# Patient Record
Sex: MALE | Race: WHITE | NOT HISPANIC OR LATINO | Employment: OTHER | URBAN - METROPOLITAN AREA
[De-identification: names, ages, dates, MRNs, and addresses within clinical notes are randomized per-mention and may not be internally consistent; named-entity substitution may affect disease eponyms.]

---

## 2017-06-05 ENCOUNTER — COMMUNICATION - HEALTHEAST (OUTPATIENT)
Dept: SCHEDULING | Facility: CLINIC | Age: 57
End: 2017-06-05

## 2017-09-03 ENCOUNTER — COMMUNICATION - HEALTHEAST (OUTPATIENT)
Dept: SCHEDULING | Facility: CLINIC | Age: 57
End: 2017-09-03

## 2017-09-03 DIAGNOSIS — E78.5 HYPERLIPIDEMIA: ICD-10-CM

## 2018-01-08 ENCOUNTER — OFFICE VISIT - HEALTHEAST (OUTPATIENT)
Dept: INTERNAL MEDICINE | Facility: CLINIC | Age: 58
End: 2018-01-08

## 2018-01-08 DIAGNOSIS — Z79.899 MEDICATION MANAGEMENT: ICD-10-CM

## 2018-01-08 DIAGNOSIS — Z00.00 ROUTINE GENERAL MEDICAL EXAMINATION AT A HEALTH CARE FACILITY: ICD-10-CM

## 2018-01-08 DIAGNOSIS — I10 ESSENTIAL HYPERTENSION: ICD-10-CM

## 2018-01-08 DIAGNOSIS — Z23 NEED FOR VACCINATION: ICD-10-CM

## 2018-01-08 DIAGNOSIS — Z12.5 PROSTATE CANCER SCREENING: ICD-10-CM

## 2018-01-08 DIAGNOSIS — E78.2 MIXED HYPERLIPIDEMIA: ICD-10-CM

## 2018-01-08 DIAGNOSIS — Z86.0101 HX OF ADENOMATOUS COLONIC POLYPS: ICD-10-CM

## 2018-01-08 LAB
ALBUMIN SERPL-MCNC: 3.8 G/DL (ref 3.5–5)
ALBUMIN UR-MCNC: NEGATIVE MG/DL
ALP SERPL-CCNC: 60 U/L (ref 45–120)
ALT SERPL W P-5'-P-CCNC: 32 U/L (ref 0–45)
ANION GAP SERPL CALCULATED.3IONS-SCNC: 11 MMOL/L (ref 5–18)
APPEARANCE UR: CLEAR
AST SERPL W P-5'-P-CCNC: 21 U/L (ref 0–40)
ATRIAL RATE - MUSE: 60 BPM
BACTERIA #/AREA URNS HPF: NORMAL HPF
BASOPHILS # BLD AUTO: 0.1 THOU/UL (ref 0–0.2)
BASOPHILS NFR BLD AUTO: 1 % (ref 0–2)
BILIRUB SERPL-MCNC: 0.3 MG/DL (ref 0–1)
BILIRUB UR QL STRIP: NEGATIVE
BUN SERPL-MCNC: 17 MG/DL (ref 8–22)
CALCIUM SERPL-MCNC: 9.3 MG/DL (ref 8.5–10.5)
CHLORIDE BLD-SCNC: 104 MMOL/L (ref 98–107)
CHOLEST SERPL-MCNC: 167 MG/DL
CO2 SERPL-SCNC: 25 MMOL/L (ref 22–31)
COLOR UR AUTO: YELLOW
CREAT SERPL-MCNC: 0.91 MG/DL (ref 0.7–1.3)
DIASTOLIC BLOOD PRESSURE - MUSE: NORMAL MMHG
EOSINOPHIL # BLD AUTO: 0.3 THOU/UL (ref 0–0.4)
EOSINOPHIL NFR BLD AUTO: 5 % (ref 0–6)
ERYTHROCYTE [DISTWIDTH] IN BLOOD BY AUTOMATED COUNT: 12.2 % (ref 11–14.5)
FASTING STATUS PATIENT QL REPORTED: YES
GFR SERPL CREATININE-BSD FRML MDRD: >60 ML/MIN/1.73M2
GLUCOSE BLD-MCNC: 103 MG/DL (ref 70–125)
GLUCOSE UR STRIP-MCNC: NEGATIVE MG/DL
HCT VFR BLD AUTO: 41.9 % (ref 40–54)
HDLC SERPL-MCNC: 46 MG/DL
HGB BLD-MCNC: 14.2 G/DL (ref 14–18)
HGB UR QL STRIP: ABNORMAL
INTERPRETATION ECG - MUSE: NORMAL
KETONES UR STRIP-MCNC: NEGATIVE MG/DL
LDLC SERPL CALC-MCNC: 91 MG/DL
LEUKOCYTE ESTERASE UR QL STRIP: NEGATIVE
LYMPHOCYTES # BLD AUTO: 1.8 THOU/UL (ref 0.8–4.4)
LYMPHOCYTES NFR BLD AUTO: 32 % (ref 20–40)
MCH RBC QN AUTO: 29.2 PG (ref 27–34)
MCHC RBC AUTO-ENTMCNC: 33.9 G/DL (ref 32–36)
MCV RBC AUTO: 86 FL (ref 80–100)
MONOCYTES # BLD AUTO: 0.5 THOU/UL (ref 0–0.9)
MONOCYTES NFR BLD AUTO: 9 % (ref 2–10)
NEUTROPHILS # BLD AUTO: 3.1 THOU/UL (ref 2–7.7)
NEUTROPHILS NFR BLD AUTO: 54 % (ref 50–70)
NITRATE UR QL: NEGATIVE
P AXIS - MUSE: 51 DEGREES
PH UR STRIP: 5.5 [PH] (ref 5–8)
PLATELET # BLD AUTO: 218 THOU/UL (ref 140–440)
PMV BLD AUTO: 7.2 FL (ref 7–10)
POTASSIUM BLD-SCNC: 3.5 MMOL/L (ref 3.5–5)
PR INTERVAL - MUSE: 212 MS
PROT SERPL-MCNC: 7.5 G/DL (ref 6–8)
PSA SERPL-MCNC: 0.4 NG/ML (ref 0–3.5)
QRS DURATION - MUSE: 104 MS
QT - MUSE: 404 MS
QTC - MUSE: 404 MS
R AXIS - MUSE: 21 DEGREES
RBC # BLD AUTO: 4.86 MILL/UL (ref 4.4–6.2)
RBC #/AREA URNS AUTO: NORMAL HPF
SODIUM SERPL-SCNC: 140 MMOL/L (ref 136–145)
SP GR UR STRIP: 1.02 (ref 1–1.03)
SQUAMOUS #/AREA URNS AUTO: NORMAL LPF
SYSTOLIC BLOOD PRESSURE - MUSE: NORMAL MMHG
T AXIS - MUSE: 42 DEGREES
TRIGL SERPL-MCNC: 152 MG/DL
UROBILINOGEN UR STRIP-ACNC: ABNORMAL
VENTRICULAR RATE- MUSE: 60 BPM
WBC #/AREA URNS AUTO: NORMAL HPF
WBC: 5.7 THOU/UL (ref 4–11)

## 2018-01-08 ASSESSMENT — MIFFLIN-ST. JEOR: SCORE: 1895.81

## 2018-01-09 ENCOUNTER — COMMUNICATION - HEALTHEAST (OUTPATIENT)
Dept: INTERNAL MEDICINE | Facility: CLINIC | Age: 58
End: 2018-01-09

## 2018-01-16 ENCOUNTER — COMMUNICATION - HEALTHEAST (OUTPATIENT)
Dept: INTERNAL MEDICINE | Facility: CLINIC | Age: 58
End: 2018-01-16

## 2018-01-16 DIAGNOSIS — J45.909 ASTHMA: ICD-10-CM

## 2018-01-16 DIAGNOSIS — I10 ESSENTIAL HYPERTENSION: ICD-10-CM

## 2018-01-17 ENCOUNTER — COMMUNICATION - HEALTHEAST (OUTPATIENT)
Dept: INTERNAL MEDICINE | Facility: CLINIC | Age: 58
End: 2018-01-17

## 2018-01-17 DIAGNOSIS — J45.909 ASTHMA: ICD-10-CM

## 2018-01-17 DIAGNOSIS — I10 ESSENTIAL HYPERTENSION: ICD-10-CM

## 2018-05-17 ENCOUNTER — COMMUNICATION - HEALTHEAST (OUTPATIENT)
Dept: SCHEDULING | Facility: CLINIC | Age: 58
End: 2018-05-17

## 2018-05-17 DIAGNOSIS — E78.2 MIXED HYPERLIPIDEMIA: ICD-10-CM

## 2018-10-14 ENCOUNTER — COMMUNICATION - HEALTHEAST (OUTPATIENT)
Dept: INTERNAL MEDICINE | Facility: CLINIC | Age: 58
End: 2018-10-14

## 2018-10-14 DIAGNOSIS — I10 ESSENTIAL HYPERTENSION: ICD-10-CM

## 2018-10-14 DIAGNOSIS — J45.909 ASTHMA: ICD-10-CM

## 2018-11-26 ENCOUNTER — COMMUNICATION - HEALTHEAST (OUTPATIENT)
Dept: SCHEDULING | Facility: CLINIC | Age: 58
End: 2018-11-26

## 2018-11-26 DIAGNOSIS — E78.2 MIXED HYPERLIPIDEMIA: ICD-10-CM

## 2018-12-31 ENCOUNTER — OFFICE VISIT - HEALTHEAST (OUTPATIENT)
Dept: INTERNAL MEDICINE | Facility: CLINIC | Age: 58
End: 2018-12-31

## 2018-12-31 DIAGNOSIS — I10 ESSENTIAL HYPERTENSION: ICD-10-CM

## 2018-12-31 LAB
ANION GAP SERPL CALCULATED.3IONS-SCNC: 9 MMOL/L (ref 5–18)
BUN SERPL-MCNC: 17 MG/DL (ref 8–22)
CALCIUM SERPL-MCNC: 9.9 MG/DL (ref 8.5–10.5)
CHLORIDE BLD-SCNC: 104 MMOL/L (ref 98–107)
CO2 SERPL-SCNC: 28 MMOL/L (ref 22–31)
CREAT SERPL-MCNC: 1.01 MG/DL (ref 0.7–1.3)
GFR SERPL CREATININE-BSD FRML MDRD: >60 ML/MIN/1.73M2
GLUCOSE BLD-MCNC: 86 MG/DL (ref 70–125)
POTASSIUM BLD-SCNC: 4.1 MMOL/L (ref 3.5–5)
SODIUM SERPL-SCNC: 141 MMOL/L (ref 136–145)

## 2018-12-31 ASSESSMENT — MIFFLIN-ST. JEOR: SCORE: 1845.91

## 2019-01-02 ENCOUNTER — COMMUNICATION - HEALTHEAST (OUTPATIENT)
Dept: INTERNAL MEDICINE | Facility: CLINIC | Age: 59
End: 2019-01-02

## 2019-01-03 ENCOUNTER — COMMUNICATION - HEALTHEAST (OUTPATIENT)
Dept: INTERNAL MEDICINE | Facility: CLINIC | Age: 59
End: 2019-01-03

## 2019-01-03 DIAGNOSIS — J45.909 ASTHMA: ICD-10-CM

## 2019-01-03 DIAGNOSIS — I10 ESSENTIAL HYPERTENSION: ICD-10-CM

## 2019-03-01 ENCOUNTER — COMMUNICATION - HEALTHEAST (OUTPATIENT)
Dept: SCHEDULING | Facility: CLINIC | Age: 59
End: 2019-03-01

## 2019-03-01 DIAGNOSIS — E78.2 MIXED HYPERLIPIDEMIA: ICD-10-CM

## 2019-11-08 ENCOUNTER — RECORDS - HEALTHEAST (OUTPATIENT)
Dept: ADMINISTRATIVE | Facility: OTHER | Age: 59
End: 2019-11-08

## 2019-11-21 ENCOUNTER — RECORDS - HEALTHEAST (OUTPATIENT)
Dept: ADMINISTRATIVE | Facility: OTHER | Age: 59
End: 2019-11-21

## 2019-11-28 ENCOUNTER — COMMUNICATION - HEALTHEAST (OUTPATIENT)
Dept: SCHEDULING | Facility: CLINIC | Age: 59
End: 2019-11-28

## 2019-11-28 DIAGNOSIS — I25.10 CORONARY ARTERY DISEASE INVOLVING NATIVE CORONARY ARTERY OF NATIVE HEART WITHOUT ANGINA PECTORIS: ICD-10-CM

## 2020-01-08 ENCOUNTER — OFFICE VISIT - HEALTHEAST (OUTPATIENT)
Dept: INTERNAL MEDICINE | Facility: CLINIC | Age: 60
End: 2020-01-08

## 2020-01-08 DIAGNOSIS — I10 ESSENTIAL HYPERTENSION: ICD-10-CM

## 2020-01-08 DIAGNOSIS — E78.2 MIXED HYPERLIPIDEMIA: ICD-10-CM

## 2020-01-08 DIAGNOSIS — J45.909 ASTHMA: ICD-10-CM

## 2020-01-08 LAB
ALBUMIN SERPL-MCNC: 4 G/DL (ref 3.5–5)
ALP SERPL-CCNC: 59 U/L (ref 45–120)
ALT SERPL W P-5'-P-CCNC: 23 U/L (ref 0–45)
ANION GAP SERPL CALCULATED.3IONS-SCNC: 9 MMOL/L (ref 5–18)
AST SERPL W P-5'-P-CCNC: 19 U/L (ref 0–40)
BILIRUB SERPL-MCNC: 0.4 MG/DL (ref 0–1)
BUN SERPL-MCNC: 18 MG/DL (ref 8–22)
CALCIUM SERPL-MCNC: 9.2 MG/DL (ref 8.5–10.5)
CHLORIDE BLD-SCNC: 106 MMOL/L (ref 98–107)
CHOLEST SERPL-MCNC: 161 MG/DL
CO2 SERPL-SCNC: 26 MMOL/L (ref 22–31)
CREAT SERPL-MCNC: 1.09 MG/DL (ref 0.7–1.3)
FASTING STATUS PATIENT QL REPORTED: NO
GFR SERPL CREATININE-BSD FRML MDRD: >60 ML/MIN/1.73M2
GLUCOSE BLD-MCNC: 109 MG/DL (ref 70–125)
HDLC SERPL-MCNC: 49 MG/DL
LDLC SERPL CALC-MCNC: 90 MG/DL
POTASSIUM BLD-SCNC: 3.8 MMOL/L (ref 3.5–5)
PROT SERPL-MCNC: 7.1 G/DL (ref 6–8)
SODIUM SERPL-SCNC: 141 MMOL/L (ref 136–145)
TRIGL SERPL-MCNC: 110 MG/DL

## 2020-01-08 ASSESSMENT — MIFFLIN-ST. JEOR: SCORE: 1845.91

## 2020-01-09 ENCOUNTER — COMMUNICATION - HEALTHEAST (OUTPATIENT)
Dept: INTERNAL MEDICINE | Facility: CLINIC | Age: 60
End: 2020-01-09

## 2020-01-09 DIAGNOSIS — J45.909 ASTHMA: ICD-10-CM

## 2020-01-09 DIAGNOSIS — I10 ESSENTIAL HYPERTENSION: ICD-10-CM

## 2020-01-17 ENCOUNTER — RECORDS - HEALTHEAST (OUTPATIENT)
Dept: ADMINISTRATIVE | Facility: OTHER | Age: 60
End: 2020-01-17

## 2020-03-06 ENCOUNTER — COMMUNICATION - HEALTHEAST (OUTPATIENT)
Dept: SCHEDULING | Facility: CLINIC | Age: 60
End: 2020-03-06

## 2020-03-06 DIAGNOSIS — I25.10 CORONARY ARTERY DISEASE INVOLVING NATIVE CORONARY ARTERY OF NATIVE HEART WITHOUT ANGINA PECTORIS: ICD-10-CM

## 2020-06-02 ENCOUNTER — COMMUNICATION - HEALTHEAST (OUTPATIENT)
Dept: SCHEDULING | Facility: CLINIC | Age: 60
End: 2020-06-02

## 2020-06-02 DIAGNOSIS — I25.10 CORONARY ARTERY DISEASE INVOLVING NATIVE CORONARY ARTERY OF NATIVE HEART WITHOUT ANGINA PECTORIS: ICD-10-CM

## 2020-06-11 ENCOUNTER — RECORDS - HEALTHEAST (OUTPATIENT)
Dept: ADMINISTRATIVE | Facility: OTHER | Age: 60
End: 2020-06-11

## 2020-12-11 ENCOUNTER — RECORDS - HEALTHEAST (OUTPATIENT)
Dept: ADMINISTRATIVE | Facility: OTHER | Age: 60
End: 2020-12-11

## 2020-12-14 ENCOUNTER — OFFICE VISIT - HEALTHEAST (OUTPATIENT)
Dept: INTERNAL MEDICINE | Facility: CLINIC | Age: 60
End: 2020-12-14

## 2020-12-14 ENCOUNTER — COMMUNICATION - HEALTHEAST (OUTPATIENT)
Dept: INTERNAL MEDICINE | Facility: CLINIC | Age: 60
End: 2020-12-14

## 2020-12-14 DIAGNOSIS — I10 ESSENTIAL HYPERTENSION: ICD-10-CM

## 2020-12-14 DIAGNOSIS — E78.2 MIXED HYPERLIPIDEMIA: ICD-10-CM

## 2020-12-14 DIAGNOSIS — Z12.5 SCREENING FOR PROSTATE CANCER: ICD-10-CM

## 2020-12-14 DIAGNOSIS — Z23 NEED FOR VACCINATION: ICD-10-CM

## 2020-12-14 DIAGNOSIS — J45.909 ASTHMA: ICD-10-CM

## 2020-12-14 LAB
ALBUMIN SERPL-MCNC: 4.5 G/DL (ref 3.5–5)
ALP SERPL-CCNC: 64 U/L (ref 45–120)
ALT SERPL W P-5'-P-CCNC: 34 U/L (ref 0–45)
ANION GAP SERPL CALCULATED.3IONS-SCNC: 10 MMOL/L (ref 5–18)
AST SERPL W P-5'-P-CCNC: 22 U/L (ref 0–40)
BILIRUB SERPL-MCNC: 0.6 MG/DL (ref 0–1)
BUN SERPL-MCNC: 14 MG/DL (ref 8–22)
CALCIUM SERPL-MCNC: 9.3 MG/DL (ref 8.5–10.5)
CHLORIDE BLD-SCNC: 101 MMOL/L (ref 98–107)
CHOLEST SERPL-MCNC: 196 MG/DL
CO2 SERPL-SCNC: 28 MMOL/L (ref 22–31)
CREAT SERPL-MCNC: 0.95 MG/DL (ref 0.7–1.3)
FASTING STATUS PATIENT QL REPORTED: ABNORMAL
GFR SERPL CREATININE-BSD FRML MDRD: >60 ML/MIN/1.73M2
GLUCOSE BLD-MCNC: 98 MG/DL (ref 70–125)
HDLC SERPL-MCNC: 53 MG/DL
LDLC SERPL CALC-MCNC: 109 MG/DL
POTASSIUM BLD-SCNC: 4.1 MMOL/L (ref 3.5–5)
PROT SERPL-MCNC: 7.7 G/DL (ref 6–8)
PSA SERPL-MCNC: 0.5 NG/ML (ref 0–3.5)
SODIUM SERPL-SCNC: 139 MMOL/L (ref 136–145)
TRIGL SERPL-MCNC: 169 MG/DL

## 2020-12-14 ASSESSMENT — MIFFLIN-ST. JEOR: SCORE: 1873.13

## 2021-01-15 ENCOUNTER — COMMUNICATION - HEALTHEAST (OUTPATIENT)
Dept: SCHEDULING | Facility: CLINIC | Age: 61
End: 2021-01-15

## 2021-01-15 DIAGNOSIS — I25.10 CORONARY ARTERY DISEASE INVOLVING NATIVE CORONARY ARTERY OF NATIVE HEART WITHOUT ANGINA PECTORIS: ICD-10-CM

## 2021-01-15 DIAGNOSIS — I10 ESSENTIAL HYPERTENSION: ICD-10-CM

## 2021-01-15 DIAGNOSIS — J45.909 ASTHMA: ICD-10-CM

## 2021-03-11 ENCOUNTER — COMMUNICATION - HEALTHEAST (OUTPATIENT)
Dept: INTERNAL MEDICINE | Facility: CLINIC | Age: 61
End: 2021-03-11

## 2021-03-11 DIAGNOSIS — I25.10 CORONARY ARTERY DISEASE INVOLVING NATIVE CORONARY ARTERY OF NATIVE HEART WITHOUT ANGINA PECTORIS: ICD-10-CM

## 2021-04-16 ENCOUNTER — OFFICE VISIT - HEALTHEAST (OUTPATIENT)
Dept: INTERNAL MEDICINE | Facility: CLINIC | Age: 61
End: 2021-04-16

## 2021-04-16 DIAGNOSIS — I10 ESSENTIAL HYPERTENSION: ICD-10-CM

## 2021-04-16 DIAGNOSIS — J45.21 MILD INTERMITTENT ASTHMA WITH EXACERBATION: ICD-10-CM

## 2021-04-16 DIAGNOSIS — Z82.49 FAMILY HISTORY OF ISCHEMIC HEART DISEASE: ICD-10-CM

## 2021-04-16 DIAGNOSIS — E66.09 CLASS 1 OBESITY DUE TO EXCESS CALORIES WITHOUT SERIOUS COMORBIDITY WITH BODY MASS INDEX (BMI) OF 30.0 TO 30.9 IN ADULT: ICD-10-CM

## 2021-04-16 DIAGNOSIS — E66.811 CLASS 1 OBESITY DUE TO EXCESS CALORIES WITHOUT SERIOUS COMORBIDITY WITH BODY MASS INDEX (BMI) OF 30.0 TO 30.9 IN ADULT: ICD-10-CM

## 2021-04-16 DIAGNOSIS — E78.2 MIXED HYPERLIPIDEMIA: ICD-10-CM

## 2021-04-22 ENCOUNTER — AMBULATORY - HEALTHEAST (OUTPATIENT)
Dept: NURSING | Facility: CLINIC | Age: 61
End: 2021-04-22

## 2021-05-13 ENCOUNTER — AMBULATORY - HEALTHEAST (OUTPATIENT)
Dept: NURSING | Facility: CLINIC | Age: 61
End: 2021-05-13

## 2021-05-21 ENCOUNTER — RECORDS - HEALTHEAST (OUTPATIENT)
Dept: ADMINISTRATIVE | Facility: OTHER | Age: 61
End: 2021-05-21

## 2021-05-28 ASSESSMENT — ASTHMA QUESTIONNAIRES
ACT_TOTALSCORE: 25
ACT_TOTALSCORE: 25

## 2021-05-30 ENCOUNTER — RECORDS - HEALTHEAST (OUTPATIENT)
Dept: ADMINISTRATIVE | Facility: CLINIC | Age: 61
End: 2021-05-30

## 2021-05-31 VITALS — WEIGHT: 231 LBS | BODY MASS INDEX: 31.29 KG/M2 | HEIGHT: 72 IN

## 2021-06-02 VITALS — HEIGHT: 72 IN | WEIGHT: 220 LBS | BODY MASS INDEX: 29.8 KG/M2

## 2021-06-03 NOTE — TELEPHONE ENCOUNTER
RN cannot approve Refill Request    RN can NOT refill this medication because last Rx was given 6/6/17,. Last office visit: 12/31/2018 Sylvester Guevara MD Last Physical: 1/8/2018 Last MTM visit: Visit date not found Last visit same specialty: Visit date not found.  Next visit within 3 mo: Visit date not found  Next physical within 3 mo: Visit date not found      Margret Whitfield, [facility] Triage/Med Refill 11/29/2019    Requested Prescriptions   Pending Prescriptions Disp Refills     LIVALO 4 mg Tab tablet [Pharmacy Med Name: LIVALO 4MG TABLETS] 90 tablet 0     Sig: TAKE 1 TABLET BY MOUTH EVERY DAY       Statins Refill Protocol (Hmg CoA Reductase Inhibitors) Passed - 11/28/2019  4:13 AM        Passed - PCP or prescribing provider visit in past 12 months      Last office visit with prescriber/PCP: 12/31/2018 Sylvester Guevara MD OR same dept: Visit date not found OR same specialty: Visit date not found  Last physical: 1/8/2018 Last MTM visit: Visit date not found   Next visit within 3 mo: Visit date not found  Next physical within 3 mo: Visit date not found  Prescriber OR PCP: Sylvester Guevara MD  Last diagnosis associated with med order: There are no diagnoses linked to this encounter.  If protocol passes may refill for 12 months if within 3 months of last provider visit (or a total of 15 months).

## 2021-06-04 VITALS
HEART RATE: 68 BPM | BODY MASS INDEX: 29.8 KG/M2 | HEIGHT: 72 IN | WEIGHT: 220 LBS | SYSTOLIC BLOOD PRESSURE: 130 MMHG | DIASTOLIC BLOOD PRESSURE: 86 MMHG | OXYGEN SATURATION: 98 %

## 2021-06-05 VITALS
TEMPERATURE: 98.4 F | BODY MASS INDEX: 31.38 KG/M2 | SYSTOLIC BLOOD PRESSURE: 116 MMHG | DIASTOLIC BLOOD PRESSURE: 72 MMHG | HEART RATE: 73 BPM | OXYGEN SATURATION: 96 % | WEIGHT: 231.4 LBS

## 2021-06-05 VITALS
BODY MASS INDEX: 30.61 KG/M2 | DIASTOLIC BLOOD PRESSURE: 76 MMHG | HEART RATE: 69 BPM | HEIGHT: 72 IN | OXYGEN SATURATION: 98 % | SYSTOLIC BLOOD PRESSURE: 132 MMHG | WEIGHT: 226 LBS

## 2021-06-05 NOTE — TELEPHONE ENCOUNTER
Refill requests already responded to.  Amlodipine and Valsartan-hydrochlorothiazide filled 1/8/20.    Sandi Wynne, JASMIN  Triage Nurse Advisor

## 2021-06-05 NOTE — PROGRESS NOTES
OFFICE VISIT NOTE    Subjective:   Chief Complaint:  Follow-up    59-year-old man in for follow-up regarding hypertension hyperlipidemia.  Past history of minor asthma.  Overall doing very well.  He is now retired.  He stays active.  No cardiopulmonary symptoms.    Current Outpatient Medications   Medication Sig     amLODIPine (NORVASC) 5 MG tablet Take 1 tablet (5 mg total) by mouth daily.     fluticasone-salmeterol (ADVAIR DISKUS) 250-50 mcg/dose DISKUS Inhale 1 puff 2 (two) times a day as needed.     LIVALO 4 mg Tab tablet TAKE 1 TABLET BY MOUTH EVERY DAY     valsartan-hydrochlorothiazide (DIOVAN-HCT) 320-25 mg per tablet Take 1 tablet by mouth daily.       PSFHx: Tobacco Status:  He  reports that he has never smoked. He has never used smokeless tobacco.    Review of Systems:  A comprehensive review of systems is negative except for the comments above    Objective:    There were no vitals taken for this visit.  GENERAL: No acute distress.  Weight is stable.  Today's blood pressure is 130/86.  His pulse is 68 and regular.  Eyes are good without any hypertensive changes.  Pedal pulses are excellent.  Carotid arteries are normal without any bruits.  Lungs are clear.  Heart shows a regular rhythm without murmur gallop or rub.  The abdomen is normal without masses or any organomegaly.    Assessment & Plan   Horace Harvey is a 59 y.o. male.    He is quite stable.  Blood pressure is at goal.  We will continue the same meds.  Check a lipid profile.  He takes the statin Livalo and tolerates this well.  He did not tolerate Zocor or Lipitor.  Asthma stable.    Diagnoses and all orders for this visit:    Essential hypertension  -     Comprehensive Metabolic Panel  -     amLODIPine (NORVASC) 5 MG tablet; Take 1 tablet (5 mg total) by mouth daily.  Dispense: 90 tablet; Refill: 3    Mixed hyperlipidemia  -     Lipid Cascade    Asthma  -     valsartan-hydrochlorothiazide (DIOVAN-HCT) 320-25 mg per tablet; Take 1 tablet by mouth  daily.  Dispense: 90 tablet; Refill: 3        The following high BMI interventions were performed this visit: encouragement to exercise    Sylvester Guevara MD  Transcription using voice recognition software, may contain typographical errors.

## 2021-06-06 NOTE — TELEPHONE ENCOUNTER
Refill Approved    Rx renewed per Medication Renewal Policy. Medication was last renewed on 11/29/19.    Tamia Solorio, [facility] Triage/Med Refill 3/6/2020     Requested Prescriptions   Pending Prescriptions Disp Refills     LIVALO 4 mg Tab tablet [Pharmacy Med Name: LIVALO 4MG TABLETS] 90 tablet 0     Sig: TAKE 1 TABLET BY MOUTH EVERY DAY       Statins Refill Protocol (Hmg CoA Reductase Inhibitors) Passed - 3/6/2020  4:13 AM        Passed - PCP or prescribing provider visit in past 12 months      Last office visit with prescriber/PCP: 1/8/2020 Sylvester Guevara MD OR same dept: Visit date not found OR same specialty: Visit date not found  Last physical: 1/8/2018 Last MTM visit: Visit date not found   Next visit within 3 mo: Visit date not found  Next physical within 3 mo: Visit date not found  Prescriber OR PCP: Sylvester Guevara MD  Last diagnosis associated with med order: 1. Coronary artery disease involving native coronary artery of native heart without angina pectoris  - LIVALO 4 mg Tab tablet [Pharmacy Med Name: LIVALO 4MG TABLETS]; TAKE 1 TABLET BY MOUTH EVERY DAY  Dispense: 90 tablet; Refill: 0    If protocol passes may refill for 12 months if within 3 months of last provider visit (or a total of 15 months).

## 2021-06-08 NOTE — TELEPHONE ENCOUNTER
Refill Approved    Rx renewed per Medication Renewal Policy. Medication was last renewed on 3/6/20.    Tamia Solorio, [facility] Triage/Med Refill 6/4/2020     Requested Prescriptions   Pending Prescriptions Disp Refills     LIVALO 4 mg Tab tablet [Pharmacy Med Name: LIVALO 4MG TABLETS] 90 tablet 0     Sig: TAKE 1 TABLET BY MOUTH EVERY DAY       Statins Refill Protocol (Hmg CoA Reductase Inhibitors) Passed - 6/2/2020  4:15 AM        Passed - PCP or prescribing provider visit in past 12 months      Last office visit with prescriber/PCP: 1/8/2020 Sylvester Guevara MD OR same dept: Visit date not found OR same specialty: Visit date not found  Last physical: 1/8/2018 Last MTM visit: Visit date not found   Next visit within 3 mo: Visit date not found  Next physical within 3 mo: Visit date not found  Prescriber OR PCP: Sylvester Guevara MD  Last diagnosis associated with med order: 1. Coronary artery disease involving native coronary artery of native heart without angina pectoris  - LIVALO 4 mg Tab tablet [Pharmacy Med Name: LIVALO 4MG TABLETS]; TAKE 1 TABLET BY MOUTH EVERY DAY  Dispense: 90 tablet; Refill: 0    If protocol passes may refill for 12 months if within 3 months of last provider visit (or a total of 15 months).

## 2021-06-13 NOTE — PROGRESS NOTES
OFFICE VISIT NOTE    Subjective:   Chief Complaint:  Follow-up    60-year-old male in for follow-up regarding hypertension, and hyperlipidemia.  He is now completely retired.  He is well without any new illnesses.  No injuries.  He stays active with no cardiopulmonary symptoms.  He can walk 4 miles without chest pain, shortness of breath, claudication-like symptoms etc.  No TIAs.  No infections.  He needs a flu shot.    Current Outpatient Medications   Medication Sig     amLODIPine (NORVASC) 5 MG tablet Take 1 tablet (5 mg total) by mouth daily.     fluticasone propion-salmeteroL (ADVAIR DISKUS) 250-50 mcg/dose DISKUS Inhale 1 puff 2 (two) times a day as needed.     LIVALO 4 mg Tab tablet TAKE 1 TABLET BY MOUTH EVERY DAY     valsartan-hydrochlorothiazide (DIOVAN-HCT) 320-25 mg per tablet Take 1 tablet by mouth daily.       PSFHx: Tobacco Status:  He  reports that he has never smoked. He has never used smokeless tobacco.    Review of Systems:  A comprehensive review of systems is negative except for the comments above    Objective:    Ht 6' (1.829 m)   Wt (!) 226 lb (102.5 kg)   BMI 30.65 kg/m    GENERAL: No acute distress.  Weight is up 6 pounds.  Today's blood pressure 132/76.  Pulse 66.  Eyes are normal without blood pressure changes.  No carotid bruits.  No JVD.  Lungs are clear.  Pedal pulses seem normal.  Heart shows a regular rhythm without murmur gallop or rub.  Is a little obese without masses or any organomegaly.  No tenderness.  No ascites.  Neurologic exam is normal.    Assessment & Plan   Horace Harvye is a 60 y.o. male.    He is stable.  I have encouraged him to get exercise and lose some of the weight he is put on this winter.  Diagnoses and all orders for this visit:    Mixed hyperlipidemia  -     Lipid Cascade    Asthma  -     fluticasone propion-salmeteroL (ADVAIR DISKUS) 250-50 mcg/dose DISKUS; Inhale 1 puff 2 (two) times a day as needed.  Dispense: 1 each; Refill: 11    Essential hypertension  -      Comprehensive Metabolic Panel  Continue with valsartan as well as amlodipine.  Screening for prostate cancer  -     PSA (Prostatic-Specific Antigen), Annual Screen    Need for vaccination  -     Influenza, Recombinant, Inj, Quadrivalent, PF, 18+YRS        The following high BMI interventions were performed this visit: encouragement to exercise    Sylvester Guevara MD  Transcription using voice recognition software, may contain typographical errors.

## 2021-06-14 NOTE — TELEPHONE ENCOUNTER
RN cannot approve Refill Request    RN can NOT refill this medication Protocol failed and NO refill given. Last office visit: Visit date not found Last Physical: Visit date not found Last MTM visit: Visit date not found Last visit same specialty: Visit date not found.  Next visit within 3 mo: Visit date not found  Next physical within 3 mo: Visit date not found    Sylvester Guevara MD not longer with [facility]    Concepcion Lewis, [facility] Triage/Med Refill 1/15/2021    Requested Prescriptions   Pending Prescriptions Disp Refills     amLODIPine (NORVASC) 5 MG tablet 90 tablet 3     Sig: Take 1 tablet (5 mg total) by mouth daily.       There is no refill protocol information for this order        valsartan-hydrochlorothiazide (DIOVAN-HCT) 320-25 mg per tablet 90 tablet 3     Sig: Take 1 tablet by mouth daily.       There is no refill protocol information for this order

## 2021-06-15 NOTE — PROGRESS NOTES
Office Visit - Physical   Horace Harvey   57 y.o.  male for annual physical examination.  He has had a good year.  He is rarely sick.  He stays physically active.  He will probably retire in the next year.  Date of visit: 1/8/2018  Physician: Sylvester Guevara MD     Assessment and Plan   1. Routine general medical examination at a health care facility    - Urinalysis-UC if Indicated    2. Mixed hyperlipidemia  Currently takes a statin livalo.  He reports no cardiovascular symptoms.  - Lipid Cascade  - Electrocardiogram Perform and Read    3. Essential hypertension  Today's blood pressure 128/78.    4. Hx of adenomatous colonic polyps  Colonoscopy is due 2019    5. Prostate cancer screening  Posterior exam today unremarkable.  - PSA (Prostatic-Specific Antigen), Annual Screen    6. Medication management  No obvious troubles with medication.  - HM1(CBC and Differential)  - Comprehensive Metabolic Panel  - HM1 (CBC with Diff)    7. Need for vaccination  He is due for a flu shot.  - Influenza, Seasonal Quad, Preservative Free 36+ Months      The following high BMI interventions were performed this visit: encouragement to exercise    No Follow-up on file.     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     History of Present Illness   This 57 y.o. old male is in for annual physical examination.  He feels well.  No chest pain with exertion.  No orthopnea  No chronic cough.  He occasionally wheezes with exercise or cold air.  He does use an inhaler as needed but not frequently.  No  dysphasia.  No change in bowel habits.  Past history of colon polyps.  He will need a colonoscopy again in 2019.  No hematuria.  No nocturia.  No musculoskeletal complaints.  No injury  No TIAs.  No peripheral neuropathy.  No epilepsy.  No migraine headaches.  No dermatologic issues other than some dry skin around his ankles.    Review of Systems: A comprehensive review of systems was negative except as noted.     Medications and Allergies   Current Outpatient Prescriptions   Medication Sig Dispense Refill     amLODIPine (NORVASC) 5 MG tablet Take 1 tablet (5 mg total) by mouth daily. 90 tablet 3     fluticasone-salmeterol (ADVAIR DISKUS) 250-50 mcg/dose DISKUS Inhale 1 puff 2 (two) times a day as needed. 1 each 11     LIVALO 4 mg Tab tablet TAKE 1 TABLET BY MOUTH EVERY DAY 90 tablet 0     valsartan-hydrochlorothiazide (DIOVAN HCT) 320-25 mg per tablet Take 1 tablet by mouth daily. 90 tablet 3     No current facility-administered medications for this visit.      Allergies   Allergen Reactions     Other Allergy (See Comments) Anaphylaxis     mushrooms     Lipitor [Atorvastatin] Myalgia     Coconut Oil Rash     Any coconut        Family and Social History   Family History   Problem Relation Age of Onset     Cancer Mother      vulva/lung     Hypertension Mother      passed age 82     Heart failure Father      passed age 68     Hypertension Sister      Breast cancer Sister      Hodgkin's lymphoma Son         Social History   Substance Use Topics     Smoking status: Never Smoker     Smokeless tobacco: Never Used     Alcohol use None        Physical Exam   General Appearance:   The appearing man was slightly overweight.  Blood pressure 128/78.  Pulse is 68 and regular.    Pulse 67  Ht 6' (1.829 m)  Wt (!) 231 lb (104.8 kg)  SpO2 95%  BMI 31.33 kg/m2    EYES: Eyelids, conjunctiva, and sclera were normal. Pupils were normal. Cornea, iris, and lens were normal bilaterally.  He wears glasses.  HEAD, EARS, NOSE, MOUTH, AND  THROAT: Head and face were normal. Hearing was normal to voice and the ears were normal to external exam. Nose appearance was normal and there was no discharge. Oropharynx was normal.  NECK: Neck appearance was normal. There were no neck masses and the thyroid was not enlarged.  No bruits are heard.  RESPIRATORY: Breathing pattern was normal and the chest moved symmetrically.  Percussion/auscultatory percussion was normal.  Lung sounds were normal and there were no abnormal sounds.  CARDIOVASCULAR: Heart rate and rhythm were normal.  S1 and S2 were normal and there were no extra sounds or murmurs. Peripheral pulses in arms and legs were normal.  Jugular venous pressure was normal.  There was no peripheral edema.  GASTROINTESTINAL: The abdomen was normal in contour.  Bowel sounds were present.  Percussion detected no organ enlargement or tenderness.  Palpation detected no tenderness, mass, or enlarged organs.   MUSCULOSKELETAL: Skeletal configuration was normal and muscle mass was normal for age. Joint appearance was overall normal.  LYMPHATIC: There were no enlarged nodes.  SKIN/HAIR/NAILS: Skin color was normal.  There were no skin lesions.  Hair and nails were normal.  There is a patch of dry thickened skin just above the left ankle.  NEUROLOGIC: The patient was alert and oriented to person, place, time, and circumstance. Speech was normal. Cranial nerves were normal. Motor strength was normal for age. The patient was normally coordinated.  PSYCHIATRIC:  Mood and affect were normal and the patient had normal recent and remote memory. The patient's judgment and insight were normal.    ADDITIONAL VITAL SIGNS: Pulse 68  CHEST WALL/BREASTS: Normal male  RECTAL: No rectal masses.  Prostate is normal for age with.  No nodules.  GENITAL/URINARY: Normal male     Additional Information        Sylvester Guevara MD  Internal Medicine  Contact me at [phone]

## 2021-06-15 NOTE — TELEPHONE ENCOUNTER
Refill Request  Did you contact pharmacy: No  Medication name:   Livalo 4MG    Who prescribed the medication: PCP  Requested Pharmacy: [facility]   Is patient out of medication: No.  4 days left  Patient notified refills processed in 3 business days:  yes  Okay to leave a detailed message: yes    Last Office Visit with PCP 12/14/20  Scheduled Est [facility] appt with Dr. Fontanez 4/16/21

## 2021-06-15 NOTE — TELEPHONE ENCOUNTER
Last Office Visit  12/14/2020 Sylvester Guevara MD  Notes:     Mixed hyperlipidemia  -     Lipid Cascade     Asthma  -     fluticasone propion-salmeteroL (ADVAIR DISKUS) 250-50 mcg/dose DISKUS; Inhale 1 puff 2 (two) times a day as needed.  Dispense: 1 each; Refill: 11     Essential hypertension  -     Comprehensive Metabolic Panel  Continue with valsartan as well as amlodipine.  Screening for prostate cancer  -     PSA (Prostatic-Specific Antigen), Annual Screen     Need for vaccination  -     Influenza, Recombinant, Inj, Quadrivalent, PF, 18+YRS    Last Filled:  LIVALO 4 mg Tab tablet 90 tablet 2 6/4/2020  No   Sig: TAKE 1 TABLET BY MOUTH EVERY DAY   Sent to pharmacy as: Livalo 4 mg tablet (pitavastatin calcium)   E-Prescribing Status: Receipt confirmed by pharmacy (6/4/2020  9:55 AM CDT)       Next OV:  4/16/2021 Kirk Fontanez MD        Medication teed up for provider signature

## 2021-06-16 PROBLEM — Z82.49 FAMILY HISTORY OF ISCHEMIC HEART DISEASE: Status: ACTIVE | Noted: 2021-04-16

## 2021-06-16 PROBLEM — J45.21 MILD INTERMITTENT ASTHMA WITH EXACERBATION: Status: ACTIVE | Noted: 2021-04-16

## 2021-06-16 PROBLEM — E66.09 CLASS 1 OBESITY DUE TO EXCESS CALORIES WITHOUT SERIOUS COMORBIDITY IN ADULT: Status: ACTIVE | Noted: 2021-04-16

## 2021-06-16 PROBLEM — E66.811 CLASS 1 OBESITY DUE TO EXCESS CALORIES WITHOUT SERIOUS COMORBIDITY IN ADULT: Status: ACTIVE | Noted: 2021-04-16

## 2021-06-16 NOTE — PROGRESS NOTES
Office Visit - Follow Up   Horace Harvey   60 y.o. 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 Sylvester Guevara, and has generally been feeling well but has gained about 15 COVID pounds over the last year.  Issues are as follows: Asthma that seems to be intermittent and very mild, he may not need to use Advair anymore, and I will prescribe him an albuterol inhaler for intermittent use.  Hyperlipidemia with history of intolerance to atorvastatin, but is able to take pitavastatin, family history of coronary heart disease (father  at age 60), plan to continue with pitvastatin, and I would also ask him to take a baby aspirin 81 mg a day.  Essential hypertension in the context of obesity, blood pressure seems to be nicely controlled on valsartan hydrochlorothiazide plus amlodipine.  Obesity with body mass index of 31.4, gained about 15 COVID pounds, I asked him to set a goal to lose 20 pounds by the end of the year.  Earwax on the right, needs to use over-the-counter wax dissolving eardrop.  Colon polyp 2014 which was a 5 mm sessile serrated adenoma in the cecum, [facility] has contacted him for 7-year recheck.  He will receive his first shot of Covid vaccine 2021.    Asthma that seems to be intermittent and very mild, he may not need to use Advair anymore, and I will prescribe him an albuterol inhaler for intermittent use.  He told me that the asthma was more bothersome around , and nowadays it is extremely mild, he notices it more in the wintertime.  I told him that Advair is really a maintenance inhaler to be used every day to suppress  asthma attacks in patients with more persistent symptoms.  I am the prescribe for him an albuterol inhaler which is more fast acting compared Advair, and would be appropriate for mild intermittent asthma which is what I think he has.  He is never been a smoker.    Hyperlipidemia with history of intolerance to atorvastatin, but is able to take pitavastatin, family history of coronary heart disease (father  at age 60), plan to continue with pitvastatin, and I would also ask him to take a baby aspirin 81 mg a day.  Lipid panel 2020 at total cholesterol 196, HDL 53, , triglycerides 169.  His LDL cholesterol was better, approximately 90 when measured 2020.  That is where he really needs to be.  I think he can get his lipids to the goal of LDL less than 100 and triglycerides less than 150 with dietary improvement and increased exercise and weight loss that he is planning to implement.    Essential hypertension in the context of obesity, blood pressure seems to be nicely controlled on valsartan hydrochlorothiazide plus amlodipine.  I suggested that he buy a home blood pressure machine that goes on the upper arms that he can track his own pressures.  His blood pressure here in clinic today looks great and 116/72, right at the target which is 120/80.    Obesity with body mass index of 31.4, gained about 15 COVID pounds, I asked him to set a goal to lose 20 pounds by the end of the year.  I reminded him about the importance of eating slowly, controlling portion size, and identifying problem foods to curtail or eliminate, especially starches, sweets, and fried foods.  He told me that alcohol consumption is approximately about 1 glass of wine a day.  Although that does not sound excessive, it does bring carbohydrate calories.  With regards to exercise, I want him to get at least 30 minutes of vigorous physical activity every day.  Walking is fine but does not burn a whole lot of calories.  He  might want to consider doing strength training, circuit style, which also provides aerobic conditioning.    Earwax on the right, needs to use over-the-counter wax dissolving eardrop.  Example over-the-counter wax dissolving eardrops are Debrox and Murine, available in the ear care section of the pharmacy.    Colon polyp 2014 which was a 5 mm sessile serrated adenoma in the cecum, [place] [facility] has contacted him for 7-year recheck.      He will receive his first shot of Covid vaccine 2021.  Last tetanus booster was in , good for 10 years.      --------------------------------------------------------------------------------------------------------------------------  History of Present Illness  This 60 y.o. old     Establishing care for this 60-year-old man, who recently retired from running a [employer] company that he owned, was previously working with Dr. Sylvester Guevara, and has generally been feeling well but has gained about 15 COVID pounds over the last year.  Issues are as follows: Asthma that seems to be intermittent and very mild, he may not need to use Advair anymore, and I will prescribe him an albuterol inhaler for intermittent use.  Hyperlipidemia with history of intolerance to atorvastatin, but is able to take pitavastatin, family history of coronary heart disease (father  at age 60), plan to continue with pitvastatin, and I would also ask him to take a baby aspirin 81 mg a day.  Essential hypertension in the context of obesity, blood pressure seems to be nicely controlled on valsartan hydrochlorothiazide plus amlodipine.  Obesity with body mass index of 31.4, gained about 15 COVID pounds, I asked him to set a goal to lose 20 pounds by the end of the year.  Earwax on the right, needs to use over-the-counter wax dissolving eardrop.  Colon polyp 2014 which was a 5 mm sessile serrated adenoma in the cecum, [facility] has contacted him for 7-year recheck.   He will receive his first shot of Covid vaccine April 22, 2021.    Asthma  Intermittent,very mild  Only 2-3 times a year  -     fluticasone propion-salmeteroL (ADVAIR DISKUS) 250-50 mcg/dose DISKUS; Inhale 1 puff 2 (two) times a day as needed.  Dispense: 1 each; Refill: 11    Family history CAD (Father dies age 68)  Mixed hyperlipidemia  pitavastatin calcium (LIVALO) 4 mg Tab tablet-- around 2010  Had muscle aches on atorvastatin    Lab Results   Component Value Date    CHOL 196 12/14/2020    CHOL 161 01/08/2020    CHOL 167 01/08/2018     Lab Results   Component Value Date    HDL 53 12/14/2020    HDL 49 01/08/2020    HDL 46 01/08/2018     Lab Results   Component Value Date    LDLCALC 109 12/14/2020    LDLCALC 90 01/08/2020    LDLCALC 91 01/08/2018     Lab Results   Component Value Date    TRIG 169 (H) 12/14/2020    TRIG 110 01/08/2020    TRIG 152 (H) 01/08/2018     No components found for: CHOLHDL    Essential hypertensionamLODIPine (NORVASC) 5 MG tablet  valsartan-hydrochlorothiazide (DIOVAN-HCT) 320-25 mg per tablet    BP Readings from Last 3 Encounters:   04/16/21 116/72   12/14/20 132/76   01/08/20 130/86     Obesity  Gained 10-15 coid pounds  Wt Readings from Last 3 Encounters:   04/16/21 (!) 231 lb 6.4 oz (105 kg)   12/14/20 (!) 226 lb (102.5 kg)   01/08/20 220 lb (99.8 kg)     Earwax right    Colonoscopy February 3, 2014  5 mm  cecum sessile serrated adenoma was detected, recheck recommended at 5 years, but he just got a letter from [facility] telling him to come in for recheck, so we are now applying a 7-year interval.    Will get COVID vaccine 4-    Lab Results   Component Value Date    PSA 0.5 12/14/2020    PSA 0.4 01/08/2018    PSA 0.3 12/13/2016     Immunization History   Administered Date(s) Administered     INFLUENZA,RECOMBINANT,INJ,PF QUADRIVALENT 18+YRS 12/31/2018, 12/14/2020     INFLUENZA,SEASONAL QUAD, PF, =/> 6months 12/07/2015, 12/13/2016, 01/08/2018     Influenza, inj,  historic,unspecified 10/28/2014     Pneumo Polysac 23-V 12/06/2010     Tdap 11/14/2012     ZOSTER, LIVE 12/09/2013     Lab Results   Component Value Date    WBC 5.7 01/08/2018    HGB 14.2 01/08/2018    HCT 41.9 01/08/2018     01/08/2018    CHOL 196 12/14/2020    TRIG 169 (H) 12/14/2020    HDL 53 12/14/2020    ALT 34 12/14/2020    AST 22 12/14/2020     12/14/2020    K 4.1 12/14/2020     12/14/2020    CREATININE 0.95 12/14/2020    BUN 14 12/14/2020    CO2 28 12/14/2020    PSA 0.5 12/14/2020        Review of Systems: A comprehensive review of systems was negative except as noted.  ---------------------------------------------------------------------------------------------------------------------------    Medications, Allergies, Social, and Problem List   Current Outpatient Medications   Medication Sig Dispense Refill     amLODIPine (NORVASC) 5 MG tablet Take 1 tablet (5 mg total) by mouth daily. 90 tablet 3     fluticasone propion-salmeteroL (ADVAIR DISKUS) 250-50 mcg/dose DISKUS Inhale 1 puff 2 (two) times a day as needed. 1 each 11     pitavastatin calcium (LIVALO) 4 mg Tab tablet Take 1 tablet (4 mg total) by mouth daily. 90 tablet 1     valsartan-hydrochlorothiazide (DIOVAN-HCT) 320-25 mg per tablet Take 1 tablet by mouth daily. 90 tablet 3     No current facility-administered medications for this visit.      Allergies   Allergen Reactions     Other Allergy (See Comments) Anaphylaxis     mushrooms     Lipitor [Atorvastatin] Myalgia     Coconut Oil Rash     Any coconut     Social History     Tobacco Use     Smoking status: Never Smoker     Smokeless tobacco: Never Used   Substance Use Topics     Alcohol use: Not on file     Drug use: Not on file     Patient Active Problem List   Diagnosis     Asthma     Hyperlipidemia     Essential hypertension     Hx of adenomatous colonic polyps        Reviewed, reconciled and updated       Physical Exam   General Appearance:   Appears well, overweight with  body mass index of 31, blood pressure is excellent.    /72 (Patient Site: Right Arm, Patient Position: Sitting, Cuff Size: Adult Large)   Pulse 73   Temp 98.4  F (36.9  C) (Oral)   Wt (!) 231 lb 6.4 oz (105 kg)   SpO2 96%   BMI 31.38 kg/m      General: Alert, in no distress  Skin: No significant lesion seen.  Eyes/nose/throat: Eyes without scleral icterus, eye movements normal, pupils equal and reactive, oropharynx clear,  + Right tympanic nares occluded with wax  MSK: Neck with good ROM  Lymphatic: Neck without adenopathy or masses  Endocrine: Thyroid with no nodules to palpation  Pulm: Lungs clear to auscultation bilaterally  Cardiac: Heart with regular rate and rhythm, no murmur or gallop  GI: Abdomen obese, soft, nontender. No palpable enlargement of liver or spleen  MSK: Extremities no tenderness or edema  Neuro: Moves all extremities, without focal weakness  Psych: Alert, normal mental status. Normal affect and speech       Additional Information   I spent 40 minutes on this encounter, including reviewing interval history since last visit, examining the patient, explaining and counseling the issues enumerated in the Assessment and Plan (patient given a copy), ordering indicated tests, ordering prescriptions       Kirk Fontanez MD

## 2021-06-16 NOTE — PATIENT INSTRUCTIONS - HE
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 Sylvester Guevara, and has generally been feeling well but has gained about 15 COVID pounds over the last year.  Issues are as follows: Asthma that seems to be intermittent and very mild, he may not need to use Advair anymore, and I will prescribe him an albuterol inhaler for intermittent use.  Hyperlipidemia with history of intolerance to atorvastatin, but is able to take pitavastatin, family history of coronary heart disease (father  at age 60), plan to continue with pitvastatin, and I would also ask him to take a baby aspirin 81 mg a day.  Essential hypertension in the context of obesity, blood pressure seems to be nicely controlled on valsartan hydrochlorothiazide plus amlodipine.  Obesity with body mass index of 31.4, gained about 15 COVID pounds, I asked him to set a goal to lose 20 pounds by the end of the year.  Earwax on the right, needs to use over-the-counter wax dissolving eardrop.  Colon polyp 2014 which was a 5 mm sessile serrated adenoma in the cecum, [facility] has contacted him for 7-year recheck.  He will receive his first shot of Covid vaccine 2021.    Asthma that seems to be intermittent and very mild, he may not need to use Advair anymore, and I will prescribe him an albuterol inhaler for intermittent use.  He told me that the asthma was more bothersome around , and nowadays it is extremely mild, he notices it more in the wintertime.  I told him that Advair is really a maintenance inhaler to be used every day to suppress asthma attacks in patients with more persistent symptoms.  I am the prescribe for him an albuterol inhaler which is more fast acting compared Advair, and would be appropriate for mild intermittent asthma which is what I think he has.  He is never been a smoker.    Hyperlipidemia with history of  intolerance to atorvastatin, but is able to take pitavastatin, family history of coronary heart disease (father  at age 60), plan to continue with pitvastatin, and I would also ask him to take a baby aspirin 81 mg a day.  Lipid panel 2020 at total cholesterol 196, HDL 53, , triglycerides 169.  His LDL cholesterol was better, approximately 90 when measured 2020.  That is where he really needs to be.  I think he can get his lipids to the goal of LDL less than 100 and triglycerides less than 150 with dietary improvement and increased exercise and weight loss that he is planning to implement.    Essential hypertension in the context of obesity, blood pressure seems to be nicely controlled on valsartan hydrochlorothiazide plus amlodipine.  I suggested that he buy a home blood pressure machine that goes on the upper arms that he can track his own pressures.  His blood pressure here in clinic today looks great and 116/72, right at the target which is 120/80.    Obesity with body mass index of 31.4, gained about 15 COVID pounds, I asked him to set a goal to lose 20 pounds by the end of the year.  I reminded him about the importance of eating slowly, controlling portion size, and identifying problem foods to curtail or eliminate, especially starches, sweets, and fried foods.  He told me that alcohol consumption is approximately about 1 glass of wine a day.  Although that does not sound excessive, it does bring carbohydrate calories.  With regards to exercise, I want him to get at least 30 minutes of vigorous physical activity every day.  Walking is fine but does not burn a whole lot of calories.  He might want to consider doing strength training, circuit style, which also provides aerobic conditioning.    Earwax on the right, needs to use over-the-counter wax dissolving eardrop.  Example over-the-counter wax dissolving eardrops are Debrox and Murine, available in the ear care section of the  pharmacy.    Colon polyp February 2014 which was a 5 mm sessile serrated adenoma in the [facility], [place] [facility] has contacted him for 7-year recheck.      He will receive his first shot of Covid vaccine April 22, 2021.  Last tetanus booster was in 2012, good for 10 years.

## 2021-06-18 NOTE — LETTER
Letter by Sylvester Guevara MD at      Author: Sylvester Guevara MD Service: -- Author Type: --    Filed:  Encounter Date: 1/2/2019 Status: (Other)       Horace Harvey  [address] [facility] [address]             January 2, 2019         Dear Mr. Harvey,    Below are the results from your recent visit:    Resulted Orders   Basic Metabolic Panel   Result Value Ref Range    Sodium 141 136 - 145 mmol/L    Potassium 4.1 3.5 - 5.0 mmol/L    Chloride 104 98 - 107 mmol/L    CO2 28 22 - 31 mmol/L    Anion Gap, Calculation 9 5 - 18 mmol/L    Glucose 86 70 - 125 mg/dL    Calcium 9.9 8.5 - 10.5 mg/dL    BUN 17 8 - 22 mg/dL    Creatinine 1.01 0.70 - 1.30 mg/dL    GFR MDRD Af Amer >60 >60 mL/min/1.73m2    GFR MDRD Non Af Amer >60 >60 mL/min/1.73m2    Narrative    Fasting Glucose reference range is 70-99 mg/dL per  American Diabetes Association (ADA) guidelines.       Horace, recent labs are reviewed  Electrolytes and kidney functions remain normal with current medications you are taking.  You are tolerating them well.    Please call with questions or contact us using [facility].    Sincerely,        Electronically signed by Sylvester Guevara MD

## 2021-06-20 NOTE — LETTER
Letter by Sylvester Guevara MD at      Author: Sylvester Guevara MD Service: -- Author Type: --    Filed:  Encounter Date: 1/9/2020 Status: Signed         Horace Harvey  [address]  [facility] [address]             January 9, 2020         Dear Mr. Harvey,    Below are the results from your recent visit:    Resulted Orders   Lipid Cascade   Result Value Ref Range    Cholesterol 161 <=199 mg/dL    Triglycerides 110 <=149 mg/dL    HDL Cholesterol 49 >=40 mg/dL    LDL Calculated 90 <=129 mg/dL    Patient Fasting > 8hrs? No    Comprehensive Metabolic Panel   Result Value Ref Range    Sodium 141 136 - 145 mmol/L    Potassium 3.8 3.5 - 5.0 mmol/L    Chloride 106 98 - 107 mmol/L    CO2 26 22 - 31 mmol/L    Anion Gap, Calculation 9 5 - 18 mmol/L    Glucose 109 70 - 125 mg/dL    BUN 18 8 - 22 mg/dL    Creatinine 1.09 0.70 - 1.30 mg/dL    GFR MDRD Af Amer >60 >60 mL/min/1.73m2    GFR MDRD Non Af Amer >60 >60 mL/min/1.73m2    Bilirubin, Total 0.4 0.0 - 1.0 mg/dL    Calcium 9.2 8.5 - 10.5 mg/dL    Protein, Total 7.1 6.0 - 8.0 g/dL    Albumin 4.0 3.5 - 5.0 g/dL    Alkaline Phosphatase 59 45 - 120 U/L    AST 19 0 - 40 U/L    ALT 23 0 - 45 U/L    Narrative    Fasting Glucose reference range is 70-99 mg/dL per  American Diabetes Association (ADA) guidelines.       Horace, recent labs are reviewed.  Lipid levels looked quite good.  Your electrolytes, blood sugar, kidney functions, and all liver function tests, remain normal.  I am glad you are enjoying your penitentiary !    Please call with questions or contact us using [employer].    Sincerely,        Electronically signed by Sylvester Guevara MD

## 2021-06-21 NOTE — LETTER
Letter by Sylvester uGevara MD at      Author: Sylvester Guevara MD Service: -- Author Type: --    Filed:  Encounter Date: 12/14/2020 Status: (Other)         Horace Harvey  [address] [facility] [address]             December 14, 2020         Dear Mr. Harvey,    Below are the results from your recent visit:    Resulted Orders   Comprehensive Metabolic Panel   Result Value Ref Range    Sodium 139 136 - 145 mmol/L    Potassium 4.1 3.5 - 5.0 mmol/L    Chloride 101 98 - 107 mmol/L    CO2 28 22 - 31 mmol/L    Anion Gap, Calculation 10 5 - 18 mmol/L    Glucose 98 70 - 125 mg/dL    BUN 14 8 - 22 mg/dL    Creatinine 0.95 0.70 - 1.30 mg/dL    GFR MDRD Af Amer >60 >60 mL/min/1.73m2    GFR MDRD Non Af Amer >60 >60 mL/min/1.73m2    Bilirubin, Total 0.6 0.0 - 1.0 mg/dL    Calcium 9.3 8.5 - 10.5 mg/dL    Protein, Total 7.7 6.0 - 8.0 g/dL    Albumin 4.5 3.5 - 5.0 g/dL    Alkaline Phosphatase 64 45 - 120 U/L    AST 22 0 - 40 U/L    ALT 34 0 - 45 U/L    Narrative    Fasting Glucose reference range is 70-99 mg/dL per  American Diabetes Association (ADA) guidelines.   Lipid Cascade   Result Value Ref Range    Cholesterol 196 <=199 mg/dL    Triglycerides 169 (H) <=149 mg/dL    HDL Cholesterol 53 >=40 mg/dL    LDL Calculated 109 <=129 mg/dL    Patient Fasting > 8hrs? Unknown    PSA (Prostatic-Specific Antigen), Annual Screen   Result Value Ref Range    PSA 0.5 0.0 - 3.5 ng/mL    Narrative    Method is Abbott Prostate-Specific Antigen (PSA)  Standard-WHO 1st International (90:10)       Horace, recent labs are reviewed.  I did a PSA, the blood test for prostate cancer, and it was normal without any signs of malignancy.  Lipids were a little higher this year.  I suspect it may be related to your Covid pounds and your weight gain.  They are certainly not terrible but if you get some weight off and you resume exercise, they will improve.  Your electrolytes, blood sugar, kidney and liver function tests, were all normal.  It was good  to see you again.  Thanks for your confidence and letting me take care of you these past several years.  I always enjoyed our visits.  Maybe someday we  will stumble across each other on a  [employer].  Please call with questions or contact us using [employer].    Sincerely,        Electronically signed by Sylvester Guevara MD

## 2021-06-22 NOTE — TELEPHONE ENCOUNTER
Refill Approved    Rx renewed per Medication Renewal Policy. Medication was last renewed on 1/17/18.    Tamia Solorio, [facility] Triage/Med Refill 1/4/2019     Requested Prescriptions   Pending Prescriptions Disp Refills     valsartan-hydrochlorothiazide (DIOVAN-HCT) 320-25 mg per tablet [Pharmacy Med Name: VALSARTAN/HCTZ 320MG/25MG TABLETS] 90 tablet 0     Sig: TAKE 1 TABLET BY MOUTH DAILY    Diuretics/Combination Diuretics Refill Protocol  Passed - 1/3/2019  9:58 AM       Passed - Visit with PCP or prescribing provider visit in past 12 months    Last office visit with prescriber/PCP: 12/31/2018 Sylvester Guevara MD OR same dept: 12/31/2018 Sylvester Guevara MD OR same specialty: 12/31/2018 Sylvester Guevara MD  Last physical: 1/8/2018 Last MTM visit: Visit date not found   Next visit within 3 mo: Visit date not found  Next physical within 3 mo: Visit date not found  Prescriber OR PCP: Sylvester Guevara MD  Last diagnosis associated with med order: 1. Asthma  - valsartan-hydrochlorothiazide (DIOVAN-HCT) 320-25 mg per tablet [Pharmacy Med Name: VALSARTAN/HCTZ 320MG/25MG TABLETS]; TAKE 1 TABLET BY MOUTH DAILY  Dispense: 90 tablet; Refill: 0    2. Essential hypertension  - amLODIPine (NORVASC) 5 MG tablet [Pharmacy Med Name: AMLODIPINE BESYLATE 5MG TABLETS]; TAKE 1 TABLET(5 MG) BY MOUTH DAILY  Dispense: 90 tablet; Refill: 0    If protocol passes may refill for 12 months if within 3 months of last provider visit (or a total of 15 months).            Passed - Serum Potassium in past 12 months     Lab Results   Component Value Date    Potassium 4.1 12/31/2018            Passed - Serum Sodium in past 12 months     Lab Results   Component Value Date    Sodium 141 12/31/2018            Passed - Blood pressure on file in past 12 months    BP Readings from Last 1 Encounters:   12/31/18 122/78            Passed - Serum Creatinine in past 12 months     Creatinine   Date Value Ref Range Status   12/31/2018 1.01 0.70 -  1.30 mg/dL Final             amLODIPine (NORVASC) 5 MG tablet [Pharmacy Med Name: AMLODIPINE BESYLATE 5MG TABLETS] 90 tablet 0     Sig: TAKE 1 TABLET(5 MG) BY MOUTH DAILY    Calcium-Channel Blockers Protocol Passed - 1/3/2019  9:58 AM       Passed - PCP or prescribing provider visit in past 12 months or next 3 months    Last office visit with prescriber/PCP: 12/31/2018 Sylvester Guevara MD OR same dept: 12/31/2018 Sylvester Guevara MD OR same specialty: 12/31/2018 Sylvester Guevara MD  Last physical: 1/8/2018 Last MTM visit: Visit date not found   Next visit within 3 mo: Visit date not found  Next physical within 3 mo: Visit date not found  Prescriber OR PCP: Sylvester Guevara MD  Last diagnosis associated with med order: 1. Asthma  - valsartan-hydrochlorothiazide (DIOVAN-HCT) 320-25 mg per tablet [Pharmacy Med Name: VALSARTAN/HCTZ 320MG/25MG TABLETS]; TAKE 1 TABLET BY MOUTH DAILY  Dispense: 90 tablet; Refill: 0    2. Essential hypertension  - amLODIPine (NORVASC) 5 MG tablet [Pharmacy Med Name: AMLODIPINE BESYLATE 5MG TABLETS]; TAKE 1 TABLET(5 MG) BY MOUTH DAILY  Dispense: 90 tablet; Refill: 0    If protocol passes may refill for 12 months if within 3 months of last provider visit (or a total of 15 months).            Passed - Blood pressure filed in past 12 months    BP Readings from Last 1 Encounters:   12/31/18 122/78

## 2021-06-22 NOTE — PROGRESS NOTES
OFFICE VISIT NOTE    Subjective:   Chief Complaint:  Follow-up (BP)    88-year-old man who is in for follow-up regarding hypertension.  He is doing well.  Currently takes amlodipine 5 mg a day along with valsartan hydrochlorothiazide.  No side effects from his medications.  He is dieting is lost some weight.  No chest pain or any shortness of breath.  No TIAs.    Current Outpatient Medications   Medication Sig     amLODIPine (NORVASC) 5 MG tablet TAKE 1 TABLET(5 MG) BY MOUTH DAILY     fluticasone-salmeterol (ADVAIR DISKUS) 250-50 mcg/dose DISKUS Inhale 1 puff 2 (two) times a day as needed.     LIVALO 4 mg Tab tablet TAKE 1 TABLET BY MOUTH EVERY DAY     valsartan-hydrochlorothiazide (DIOVAN-HCT) 320-25 mg per tablet TAKE 1 TABLET BY MOUTH DAILY       Review of Systems:  A comprehensive review of systems is negative except for the comments above    Objective:    Pulse 62   Ht 6' (1.829 m)   Wt 220 lb (99.8 kg)   SpO2 96%   BMI 29.84 kg/m    GENERAL: No acute distress.  Today's blood pressure is 1 2/78.  He stands is 120/78.  Pulse is 70 and regular.  Heart shows a regular rhythm without murmur gallop or rub.  No carotid bruits.    Assessment & Plan   Horace Harvey is a 58 y.o. male.    Hypertension under excellent control.  We will continue the same meds.  Check a basic metabolic profile to evaluate kidney function and potassium.  I will see him for his physical in the summer.    Diagnoses and all orders for this visit:    Essential hypertension  -     Basic Metabolic Panel    Other orders  -     Influenza, Recombinant, Inj, Quadrivalent, PF, 18+YRS        Sylvester Guevara MD  Transcription using voice recognition software, may contain typographical errors.

## 2021-06-24 NOTE — TELEPHONE ENCOUNTER
Refill Approved    Rx renewed per Medication Renewal Policy. Medication was last renewed on 6/6/17.    Tamia Solorio, [facility] Triage/Med Refill 3/1/2019     Requested Prescriptions   Pending Prescriptions Disp Refills     LIVALO 4 mg Tab tablet [Pharmacy Med Name: LIVALO 4MG TABLETS] 90 tablet 0     Sig: TAKE 1 TABLET BY MOUTH EVERY DAY    Statins Refill Protocol (Hmg CoA Reductase Inhibitors) Passed - 3/1/2019 10:09 AM       Passed - PCP or prescribing provider visit in past 12 months     Last office visit with prescriber/PCP: 12/31/2018 Sylvester Guevara MD OR same dept: Visit date not found OR same specialty: Visit date not found  Last physical: 1/8/2018 Last MTM visit: Visit date not found   Next visit within 3 mo: Visit date not found  Next physical within 3 mo: Visit date not found  Prescriber OR PCP: Sylvester Guevara MD  Last diagnosis associated with med order: There are no diagnoses linked to this encounter.  If protocol passes may refill for 12 months if within 3 months of last provider visit (or a total of 15 months).

## 2021-07-03 NOTE — ADDENDUM NOTE
Addendum Note by Yudith King MLT at 1/8/2018 11:30 AM     Author: Yudith King MLT Service: -- Author Type:     Filed: 1/8/2018 11:30 AM Encounter Date: 1/8/2018 Status: Signed    : Yudith King MLT ()    Addended by: YUDITH KING on: 1/8/2018 11:30 AM        Modules accepted: Orders

## 2021-07-24 ENCOUNTER — HEALTH MAINTENANCE LETTER (OUTPATIENT)
Age: 61
End: 2021-07-24

## 2021-09-05 DIAGNOSIS — I25.10 CORONARY ARTERY DISEASE INVOLVING NATIVE CORONARY ARTERY OF NATIVE HEART WITHOUT ANGINA PECTORIS: Primary | ICD-10-CM

## 2021-09-05 RX ORDER — PITAVASTATIN CALCIUM 4.18 MG/1
TABLET, FILM COATED ORAL
Qty: 90 TABLET | Refills: 1 | Status: SHIPPED | OUTPATIENT
Start: 2021-09-05 | End: 2022-03-10

## 2021-09-05 NOTE — TELEPHONE ENCOUNTER
"Disp Refills Start End TL    pitavastatin calcium (LIVALO) 4 mg Tab tablet 90 tablet 1 3/11/2021  No   Sig - Route: Take 1 tablet (4 mg total) by mouth daily. - Oral   Sent to pharmacy as: pitavastatin calcium 4 mg tablet (Livalo)   E-Prescribing Status: Receipt confirmed by pharmacy (3/11/2021 10:07 AM CST)       Last Written Prescription Date:  3/11/21  Last Fill Quantity: 90,  # refills: 1   Last office visit provider:  4/16/21     Requested Prescriptions   Pending Prescriptions Disp Refills     LIVALO 4 MG TABS tablet [Pharmacy Med Name: LIVALO 4MG TABLETS] 90 tablet      Sig: TAKE 1 TABLET(4 MG) BY MOUTH DAILY       Statins Protocol Failed - 9/5/2021  4:17 AM        Failed - Recent (12 mo) or future (30 days) visit within the authorizing provider's specialty     Patient has had an office visit with the authorizing provider or a provider within the authorizing providers department within the previous 12 mos or has a future within next 30 days. See \"Patient Info\" tab in inbasket, or \"Choose Columns\" in Meds & Orders section of the refill encounter.              Passed - LDL on file in past 12 months     Recent Labs   Lab Test 12/14/20  1521                Passed - No abnormal creatine kinase in past 12 months     No lab results found.             Passed - Medication is active on med list        Passed - Patient is age 18 or older             Ady Joaquin RN 09/05/21 1:41 PM  "

## 2021-09-18 ENCOUNTER — HEALTH MAINTENANCE LETTER (OUTPATIENT)
Age: 61
End: 2021-09-18

## 2022-01-17 ENCOUNTER — TELEPHONE (OUTPATIENT)
Dept: INTERNAL MEDICINE | Facility: CLINIC | Age: 62
End: 2022-01-17
Payer: COMMERCIAL

## 2022-01-17 DIAGNOSIS — I10 ESSENTIAL HYPERTENSION: Primary | ICD-10-CM

## 2022-01-17 RX ORDER — AMLODIPINE BESYLATE 5 MG/1
5 TABLET ORAL DAILY
Qty: 90 TABLET | Refills: 1 | Status: SHIPPED | OUTPATIENT
Start: 2022-01-17 | End: 2022-07-20

## 2022-01-17 RX ORDER — VALSARTAN AND HYDROCHLOROTHIAZIDE 320; 25 MG/1; MG/1
1 TABLET, FILM COATED ORAL DAILY
Qty: 90 TABLET | Refills: 1 | Status: SHIPPED | OUTPATIENT
Start: 2022-01-17 | End: 2022-07-20

## 2022-01-17 NOTE — TELEPHONE ENCOUNTER
Reason for Call: Request for an order or referral:    Order or referral being requested: Medication Refill   (amlodipine,Valsartan(Diovan PO)    Date needed: as soon as possible    Has the patient been seen by the PCP for this problem? YES    Additional comments: Patient tried to schedule a physical with Dr. Fontanez unable to find anything soon, pt is scheduled for 01/25/22. Patient mentioned he needed medications sooner then this appointment date.Please check and advise.    Phone number Patient can be reached at:  Cell number on file:    Telephone Information:   Mobile [phone]   Mobile [phone]       Best Time:  Anytime    Can we leave a detailed message on this number?  YES    Call taken on 1/17/2022 at 3:23 PM by Bindu Menard

## 2022-01-25 ENCOUNTER — OFFICE VISIT (OUTPATIENT)
Dept: FAMILY MEDICINE | Facility: CLINIC | Age: 62
End: 2022-01-25
Payer: COMMERCIAL

## 2022-01-25 VITALS
OXYGEN SATURATION: 97 % | WEIGHT: 218 LBS | HEIGHT: 71 IN | HEART RATE: 66 BPM | DIASTOLIC BLOOD PRESSURE: 86 MMHG | BODY MASS INDEX: 30.52 KG/M2 | SYSTOLIC BLOOD PRESSURE: 128 MMHG

## 2022-01-25 DIAGNOSIS — E78.5 HYPERLIPIDEMIA, UNSPECIFIED HYPERLIPIDEMIA TYPE: ICD-10-CM

## 2022-01-25 DIAGNOSIS — I10 ESSENTIAL HYPERTENSION: Primary | ICD-10-CM

## 2022-01-25 DIAGNOSIS — Z11.4 SCREENING FOR HIV (HUMAN IMMUNODEFICIENCY VIRUS): ICD-10-CM

## 2022-01-25 DIAGNOSIS — Z11.59 NEED FOR HEPATITIS C SCREENING TEST: ICD-10-CM

## 2022-01-25 DIAGNOSIS — Z00.00 ANNUAL PHYSICAL EXAM: ICD-10-CM

## 2022-01-25 DIAGNOSIS — Z12.5 SPECIAL SCREENING FOR MALIGNANT NEOPLASM OF PROSTATE: ICD-10-CM

## 2022-01-25 DIAGNOSIS — Z00.00 HEALTHCARE MAINTENANCE: ICD-10-CM

## 2022-01-25 DIAGNOSIS — Z23 INFLUENZA VACCINE NEEDED: ICD-10-CM

## 2022-01-25 LAB
ALBUMIN SERPL-MCNC: 4.5 G/DL (ref 3.5–5)
ALP SERPL-CCNC: 66 U/L (ref 45–120)
ALT SERPL W P-5'-P-CCNC: 32 U/L (ref 0–45)
ANION GAP SERPL CALCULATED.3IONS-SCNC: 12 MMOL/L (ref 5–18)
AST SERPL W P-5'-P-CCNC: 21 U/L (ref 0–40)
BILIRUB SERPL-MCNC: 0.8 MG/DL (ref 0–1)
BUN SERPL-MCNC: 15 MG/DL (ref 8–22)
CALCIUM SERPL-MCNC: 10.2 MG/DL (ref 8.5–10.5)
CHLORIDE BLD-SCNC: 100 MMOL/L (ref 98–107)
CHOLEST SERPL-MCNC: 180 MG/DL
CO2 SERPL-SCNC: 28 MMOL/L (ref 22–31)
CREAT SERPL-MCNC: 1.02 MG/DL (ref 0.7–1.3)
ERYTHROCYTE [DISTWIDTH] IN BLOOD BY AUTOMATED COUNT: 11.9 % (ref 10–15)
FASTING STATUS PATIENT QL REPORTED: YES
GFR SERPL CREATININE-BSD FRML MDRD: 84 ML/MIN/1.73M2
GLUCOSE BLD-MCNC: 101 MG/DL (ref 70–125)
HCT VFR BLD AUTO: 44.1 % (ref 40–53)
HCV AB SERPL QL IA: NONREACTIVE
HDLC SERPL-MCNC: 48 MG/DL
HGB BLD-MCNC: 15.1 G/DL (ref 13.3–17.7)
HIV 1+2 AB+HIV1 P24 AG SERPL QL IA: NEGATIVE
LDLC SERPL CALC-MCNC: 91 MG/DL
MCH RBC QN AUTO: 28.8 PG (ref 26.5–33)
MCHC RBC AUTO-ENTMCNC: 34.2 G/DL (ref 31.5–36.5)
MCV RBC AUTO: 84 FL (ref 78–100)
PLATELET # BLD AUTO: 231 10E3/UL (ref 150–450)
POTASSIUM BLD-SCNC: 4.1 MMOL/L (ref 3.5–5)
PROT SERPL-MCNC: 7.8 G/DL (ref 6–8)
PSA SERPL-MCNC: 0.44 UG/L (ref 0–4.5)
RBC # BLD AUTO: 5.24 10E6/UL (ref 4.4–5.9)
SODIUM SERPL-SCNC: 140 MMOL/L (ref 136–145)
TRIGL SERPL-MCNC: 204 MG/DL
WBC # BLD AUTO: 6.5 10E3/UL (ref 4–11)

## 2022-01-25 PROCEDURE — 90682 RIV4 VACC RECOMBINANT DNA IM: CPT | Performed by: NURSE PRACTITIONER

## 2022-01-25 PROCEDURE — 80053 COMPREHEN METABOLIC PANEL: CPT | Performed by: NURSE PRACTITIONER

## 2022-01-25 PROCEDURE — 99396 PREV VISIT EST AGE 40-64: CPT | Mod: 25 | Performed by: NURSE PRACTITIONER

## 2022-01-25 PROCEDURE — 87389 HIV-1 AG W/HIV-1&-2 AB AG IA: CPT | Performed by: NURSE PRACTITIONER

## 2022-01-25 PROCEDURE — G0103 PSA SCREENING: HCPCS | Performed by: NURSE PRACTITIONER

## 2022-01-25 PROCEDURE — 90471 IMMUNIZATION ADMIN: CPT | Performed by: NURSE PRACTITIONER

## 2022-01-25 PROCEDURE — 36415 COLL VENOUS BLD VENIPUNCTURE: CPT | Performed by: NURSE PRACTITIONER

## 2022-01-25 PROCEDURE — 80061 LIPID PANEL: CPT | Performed by: NURSE PRACTITIONER

## 2022-01-25 PROCEDURE — 86803 HEPATITIS C AB TEST: CPT | Performed by: NURSE PRACTITIONER

## 2022-01-25 PROCEDURE — 85027 COMPLETE CBC AUTOMATED: CPT | Performed by: NURSE PRACTITIONER

## 2022-01-25 RX ORDER — ALBUTEROL SULFATE 90 UG/1
2 AEROSOL, METERED RESPIRATORY (INHALATION)
COMMUNITY
Start: 2021-04-16

## 2022-01-25 ASSESSMENT — MIFFLIN-ST. JEOR: SCORE: 1820.97

## 2022-01-25 NOTE — PROGRESS NOTES
SUBJECTIVE:   CC: Horace Harvey is an 61 year old male who presents for preventative health visit.   Patient has been advised of split billing requirements and indicates understanding: Yes  Healthy Habits:     Getting at least 3 servings of Calcium per day:  NO    Bi-annual eye exam:  Yes    Dental care twice a year:  Yes    Sleep apnea or symptoms of sleep apnea:  None    Diet:  Regular (no restrictions)    Frequency of exercise:  6-7 days/week    Duration of exercise:  45-60 minutes    Taking medications regularly:  Yes    PHQ-2 Total Score: 0    Additional concerns today:  No      Today's PHQ-2 Score:   PHQ-2 ( 1999 [employer]) 1/25/2022   Q1: Little interest or pleasure in doing things 0   Q2: Feeling down, depressed or hopeless 0   PHQ-2 Score 0   Q1: Little interest or pleasure in doing things Not at all   Q2: Feeling down, depressed or hopeless Not at all   PHQ-2 Score 0       Abuse: Current or Past(Physical, Sexual or Emotional)- No  Do you feel safe in your environment? Yes    Have you ever done Advance Care Planning? (For example, a Health Directive, POLST, or a discussion with a medical provider or your loved ones about your wishes): No, advance care planning information given to patient to review.  Patient declined advance care planning discussion at this time.    Social History     Tobacco Use     Smoking status: Never Smoker     Smokeless tobacco: Never Used   Substance Use Topics     Alcohol use: Not on file     If you drink alcohol do you typically have >3 drinks per day or >7 drinks per week? Yes    Alcohol Use 1/25/2022   Prescreen: >3 drinks/day or >7 drinks/week? Yes   AUDIT SCORE  4     AUDIT - Alcohol Use Disorders Identification Test - Reproduced from the World Health Organization Audit 2001 (Second Edition) 1/25/2022   1.  How often do you have a drink containing alcohol? 4 or more times a week   2.  How many drinks containing alcohol do you have on a typical day when you are drinking? 1 or 2   3.   How often do you have five or more drinks on one occasion? Never   4.  How often during the last year have you found that you were not able to stop drinking once you had started? Never   5.  How often during the last year have you failed to do what was normally expected of you because of drinking? Never   6.  How often during the last year have you needed a first drink in the morning to get yourself going after a heavy drinking session? Never   7.  How often during the last year have you had a feeling of guilt or remorse after drinking? Never   8.  How often during the last year have you been unable to remember what happened the night before because of your drinking? Never   9.  Have you or someone else been injured because of your drinking? No   10. Has a relative, friend, doctor or other health care worker been concerned about your drinking or suggested you cut down? No   TOTAL SCORE 4       Last PSA:   Prostate Specific Antigen Screen   Date Value Ref Range Status   12/14/2020 0.5 0.0 - 3.5 ng/mL Final       Reviewed orders with patient. Reviewed health maintenance and updated orders accordingly - Yes  Lab work is in process    Reviewed and updated as needed this visit by clinical staff                Reviewed and updated as needed this visit by Provider               Past Medical History:   Diagnosis Date     High cholesterol      Hyperlipidemia      Hypertension         Review of Systems  CONSTITUTIONAL: NEGATIVE for fever, chills, change in weight  INTEGUMENTARY/SKIN: NEGATIVE for worrisome rashes, moles or lesions  EYES: NEGATIVE for vision changes or irritation  ENT: NEGATIVE for ear, mouth and throat problems  RESP: NEGATIVE for significant cough or SOB  CV: NEGATIVE for chest pain, palpitations or peripheral edema  GI: NEGATIVE for nausea, abdominal pain, heartburn, or change in bowel habits   male: negative for dysuria, hematuria, decreased urinary stream, erectile dysfunction, urethral  neck pain "discharge  MUSCULOSKELETAL: NEGATIVE for significant arthralgias or myalgia  NEURO: NEGATIVE for weakness, dizziness or paresthesias  PSYCHIATRIC: NEGATIVE for changes in mood or affect    OBJECTIVE:   /86 (BP Location: Left arm, Patient Position: Sitting, Cuff Size: Adult Regular)   Pulse 66   Ht 1.803 m (5' 11\")   Wt 98.9 kg (218 lb)   SpO2 97%   BMI 30.40 kg/m      Physical Exam  GENERAL: healthy, alert and no distress  NECK: no adenopathy, no asymmetry, masses, or scars and thyroid normal to palpation  RESP: lungs clear to auscultation - no rales, rhonchi or wheezes  CV: regular rate and rhythm, normal S1 S2, no S3 or S4, no murmur, click or rub, no peripheral edema and peripheral pulses strong  ABDOMEN: soft, nontender, no hepatosplenomegaly, no masses and bowel sounds normal  MS: no gross musculoskeletal defects noted, no edema    Diagnostic Test Results:  Labs reviewed in Epic    ASSESSMENT/PLAN:   1. Annual physical exam  Generally healthy 61-year-old male.  I congratulated him on some weight loss he has achieved since we last saw him    2. Healthcare maintenance  He would like his flu shot today but he declined his COVID-19 booster.  We had a short conversation about the Shingrix vaccine.  He will check with insurance.  He did get the Zostavax shot in 2013.  He is due for his repeat colonoscopy in 2027    3. Screening for HIV (human immunodeficiency virus)  - HIV Antigen Antibody Combo; Future    4. Need for hepatitis C screening test  - Hepatitis C Screen Reflex to HCV RNA Quant and Genotype; Future    5. Essential hypertension  Well-controlled on current regimen  - Comprehensive metabolic panel; Future  - CBC with platelets; Future    6. Hyperlipidemia, unspecified hyperlipidemia type  He is on Livalo for cholesterol.  He tells me that there is a strong family history of coronary artery disease and he would like to further stratify his risk.  We talked about coronary artery calcium score " testing.  I placed an order so that he can get this done.  He is not taking a baby aspirin as instructed by his PCP    - Lipid panel; Future  - CT Coronary Calcium Scan; Future    7. Special screening for malignant neoplasm of prostate  - PSA, screen; Future    8. Influenza vaccine needed  - INFLUENZA QUAD, RECOMBINANT, P-FREE (RIV4) (FLUBLOK) [[id number]]    Patient Instructions   Please call [phone] to set up the coronary artery calcium score test.    We will recheck a variety of labs today.  Results will come through on [facility].    Blood pressure and other vital signs look good.    Continue to work on weight loss.    You were instructed by Dr. Fontanez to start a baby aspirin at your last physical.    Otherwise, no changes in medications.    Flu shot today      Patient has been advised of split billing requirements and indicates understanding: No    COUNSELING:   Reviewed preventive health counseling, as reflected in patient instructions       Regular exercise       Healthy diet/nutrition       Vision screening       Hearing screening    Estimated body mass index is 31.38 kg/m  as calculated from the following:    Height as of 12/14/20: 1.829 m (6').    Weight as of 4/16/21: 105 kg (231 lb 6.4 oz).     Weight management plan: Discussed healthy diet and exercise guidelines    He reports that he has never smoked. He has never used smokeless tobacco.      Counseling Resources:  ATP IV Guidelines  Pooled Cohorts Equation Calculator  FRAX Risk Assessment  ICSI Preventive Guidelines  Dietary Guidelines for Americans, 2010  USDA's MyPlate  ASA Prophylaxis  Lung CA Screening    Primo Nielson, [facility]

## 2022-01-25 NOTE — PATIENT INSTRUCTIONS
Please call [phone] to set up the coronary artery calcium score test.    We will recheck a variety of labs today.  Results will come through on [place].    Blood pressure and other vital signs look good.    Continue to work on weight loss.    You were instructed by Dr. Fontanez to start a baby aspirin at your last physical.    Otherwise, no changes in medications.    Flu shot today

## 2022-01-31 ENCOUNTER — HOSPITAL ENCOUNTER (OUTPATIENT)
Dept: CT IMAGING | Facility: CLINIC | Age: 62
Discharge: HOME | End: 2022-01-31
Attending: NURSE PRACTITIONER | Admitting: NURSE PRACTITIONER

## 2022-01-31 DIAGNOSIS — E78.5 HYPERLIPIDEMIA, UNSPECIFIED HYPERLIPIDEMIA TYPE: ICD-10-CM

## 2022-01-31 LAB
CV CALCIUM SCORE AGATSTON LM: 0
CV CALCIUM SCORING AGATSON LAD: 0
CV CALCIUM SCORING AGATSTON CX: 0
CV CALCIUM SCORING AGATSTON RCA: 0
CV CALCIUM SCORING AGATSTON TOTAL: 0

## 2022-01-31 PROCEDURE — 75571 CT HRT W/O DYE W/CA TEST: CPT | Mod: 26 | Performed by: INTERNAL MEDICINE

## 2022-01-31 PROCEDURE — 75571 CT HRT W/O DYE W/CA TEST: CPT

## 2022-03-10 DIAGNOSIS — I25.10 CORONARY ARTERY DISEASE INVOLVING NATIVE CORONARY ARTERY OF NATIVE HEART WITHOUT ANGINA PECTORIS: ICD-10-CM

## 2022-03-10 RX ORDER — PITAVASTATIN CALCIUM 4.18 MG/1
TABLET, FILM COATED ORAL
Qty: 90 TABLET | Refills: 1 | Status: SHIPPED | OUTPATIENT
Start: 2022-03-10 | End: 2022-12-01

## 2022-03-10 NOTE — TELEPHONE ENCOUNTER
"Routing refill request to provider for review/approval because:  Allergy contraindication    Last Written Prescription Date:  9/5/21  Last Fill Quantity: 90,  # refills: 1   Last office visit provider:  1/25/22     Requested Prescriptions   Pending Prescriptions Disp Refills     LIVALO 4 MG TABS tablet [Pharmacy Med Name: LIVALO 4MG TABLETS] 90 tablet 1     Sig: TAKE 1 TABLET(4 MG) BY MOUTH DAILY       Statins Protocol Passed - 3/10/2022  8:25 AM        Passed - LDL on file in past 12 months     Recent Labs   Lab Test 01/25/22  0954   LDL 91             Passed - No abnormal creatine kinase in past 12 months     No lab results found.             Passed - Recent (12 mo) or future (30 days) visit within the authorizing provider's specialty     Patient has had an office visit with the authorizing provider or a provider within the authorizing providers department within the previous 12 mos or has a future within next 30 days. See \"Patient Info\" tab in inbasket, or \"Choose Columns\" in Meds & Orders section of the refill encounter.              Passed - Medication is active on med list        Passed - Patient is age 18 or older             Saranya Combs RN 03/10/22 2:26 PM  "

## 2022-06-12 DIAGNOSIS — I25.10 CORONARY ARTERY DISEASE INVOLVING NATIVE CORONARY ARTERY OF NATIVE HEART WITHOUT ANGINA PECTORIS: ICD-10-CM

## 2022-06-13 RX ORDER — PITAVASTATIN CALCIUM 4.18 MG/1
TABLET, FILM COATED ORAL
Qty: 90 TABLET | Refills: 1 | OUTPATIENT
Start: 2022-06-13

## 2022-07-20 DIAGNOSIS — I10 ESSENTIAL HYPERTENSION: ICD-10-CM

## 2022-07-20 RX ORDER — VALSARTAN AND HYDROCHLOROTHIAZIDE 320; 25 MG/1; MG/1
1 TABLET, FILM COATED ORAL DAILY
Qty: 90 TABLET | Refills: 1 | Status: SHIPPED | OUTPATIENT
Start: 2022-07-20 | End: 2023-01-24

## 2022-07-20 RX ORDER — AMLODIPINE BESYLATE 5 MG/1
TABLET ORAL
Qty: 90 TABLET | Refills: 1 | Status: CANCELLED | OUTPATIENT
Start: 2022-07-20

## 2022-07-20 RX ORDER — VALSARTAN AND HYDROCHLOROTHIAZIDE 320; 25 MG/1; MG/1
1 TABLET, FILM COATED ORAL DAILY
Qty: 90 TABLET | Refills: 1 | Status: CANCELLED | OUTPATIENT
Start: 2022-07-20

## 2022-07-20 RX ORDER — AMLODIPINE BESYLATE 5 MG/1
TABLET ORAL
Qty: 90 TABLET | Refills: 1 | Status: SHIPPED | OUTPATIENT
Start: 2022-07-20 | End: 2023-01-23

## 2022-07-20 NOTE — TELEPHONE ENCOUNTER
"Last Written Prescription Date:  1/17/22  Last Fill Quantity: 90,  # refills: 1   Last office visit provider:  1/25/22     Requested Prescriptions   Pending Prescriptions Disp Refills     amLODIPine (NORVASC) 5 MG tablet [Pharmacy Med Name: AMLODIPINE BESYLATE 5MG TABLETS] 90 tablet 1     Sig: TAKE 1 TABLET(5 MG) BY MOUTH DAILY       Calcium Channel Blockers Protocol  Failed - 7/20/2022 10:04 AM        Failed - Recent (12 mo) or future (30 days) visit within the authorizing provider's specialty     Patient has had an office visit with the authorizing provider or a provider within the authorizing providers department within the previous 12 mos or has a future within next 30 days. See \"Patient Info\" tab in inbasket, or \"Choose Columns\" in Meds & Orders section of the refill encounter.              Passed - Blood pressure under 140/90 in past 12 months     BP Readings from Last 3 Encounters:   01/25/22 128/86   04/16/21 116/72   12/14/20 132/76                 Passed - Medication is active on med list        Passed - Patient is age 18 or older        Passed - Normal serum creatinine on file in past 12 months     Recent Labs   Lab Test 01/25/22  0954   CR 1.02       Ok to refill medication if creatinine is low             valsartan-hydrochlorothiazide (DIOVAN HCT) 320-25 MG tablet [Pharmacy Med Name: VALSARTAN/HCTZ 320MG/25MG TABLETS] 90 tablet 1     Sig: TAKE 1 TABLET BY MOUTH DAILY       Angiotensin-II Receptors Failed - 7/20/2022 10:04 AM        Failed - Recent (12 mo) or future (30 days) visit within the authorizing provider's specialty     Patient has had an office visit with the authorizing provider or a provider within the authorizing providers department within the previous 12 mos or has a future within next 30 days. See \"Patient Info\" tab in inbasket, or \"Choose Columns\" in Meds & Orders section of the refill encounter.              Passed - Last blood pressure under 140/90 in past 12 months     BP Readings " "from Last 3 Encounters:   01/25/22 128/86   04/16/21 116/72   12/14/20 132/76                 Passed - Medication is active on med list        Passed - Patient is age 18 or older        Passed - Normal serum creatinine on file in past 12 months     Recent Labs   Lab Test 01/25/22  0954   CR 1.02       Ok to refill medication if creatinine is low          Passed - Normal serum potassium on file in past 12 months     Recent Labs   Lab Test 01/25/22  0954   POTASSIUM 4.1                   Diuretics (Including Combos) Protocol Failed - 7/20/2022 10:04 AM        Failed - Recent (12 mo) or future (30 days) visit within the authorizing provider's specialty     Patient has had an office visit with the authorizing provider or a provider within the authorizing providers department within the previous 12 mos or has a future within next 30 days. See \"Patient Info\" tab in inbasket, or \"Choose Columns\" in Meds & Orders section of the refill encounter.              Passed - Blood pressure under 140/90 in past 12 months     BP Readings from Last 3 Encounters:   01/25/22 128/86   04/16/21 116/72   12/14/20 132/76                 Passed - Medication is active on med list        Passed - Patient is age 18 or older        Passed - Normal serum creatinine on file in past 12 months     Recent Labs   Lab Test 01/25/22  0954   CR 1.02              Passed - Normal serum potassium on file in past 12 months     Recent Labs   Lab Test 01/25/22  0954   POTASSIUM 4.1                    Passed - Normal serum sodium on file in past 12 months     Recent Labs   Lab Test 01/25/22  0954                      Tamia Solorio, RN 07/20/22 1:11 PM  "

## 2022-10-20 ENCOUNTER — TRANSFERRED RECORDS (OUTPATIENT)
Dept: HEALTH INFORMATION MANAGEMENT | Facility: CLINIC | Age: 62
End: 2022-10-20

## 2022-11-20 ENCOUNTER — HEALTH MAINTENANCE LETTER (OUTPATIENT)
Age: 62
End: 2022-11-20

## 2022-12-01 DIAGNOSIS — I25.10 CORONARY ARTERY DISEASE INVOLVING NATIVE CORONARY ARTERY OF NATIVE HEART WITHOUT ANGINA PECTORIS: ICD-10-CM

## 2022-12-02 RX ORDER — PITAVASTATIN CALCIUM 4.18 MG/1
TABLET, FILM COATED ORAL
Qty: 90 TABLET | Refills: 0 | Status: SHIPPED | OUTPATIENT
Start: 2022-12-02 | End: 2023-03-05

## 2022-12-02 NOTE — TELEPHONE ENCOUNTER
"Last Written Prescription Date:  3/10/22  Last Fill Quantity: 90,  # refills: 1   Last office visit provider:  1/25/22     Requested Prescriptions   Pending Prescriptions Disp Refills     pitavastatin (LIVALO) 4 MG TABS tablet 90 tablet 1     Sig: TAKE 1 TABLET(4 MG) BY MOUTH DAILY  Strength: 4 mg       Statins Protocol Failed - 12/2/2022 10:08 AM        Failed - Recent (12 mo) or future (30 days) visit within the authorizing provider's specialty     Patient has had an office visit with the authorizing provider or a provider within the authorizing providers department within the previous 12 mos or has a future within next 30 days. See \"Patient Info\" tab in inbasket, or \"Choose Columns\" in Meds & Orders section of the refill encounter.              Passed - LDL on file in past 12 months     Recent Labs   Lab Test 01/25/22  0954   LDL 91             Passed - No abnormal creatine kinase in past 12 months     No lab results found.             Passed - Medication is active on med list        Passed - Patient is age 18 or older             Ady Joaquin RN 12/02/22 10:09 AM  "

## 2022-12-20 NOTE — TELEPHONE ENCOUNTER
Refilled 3/10/22 with 1 refills.   Enbrel Counseling:  I discussed with the patient the risks of etanercept including but not limited to myelosuppression, immunosuppression, autoimmune hepatitis, demyelinating diseases, lymphoma, and infections.  The patient understands that monitoring is required including a PPD at baseline and must alert us or the primary physician if symptoms of infection or other concerning signs are noted.

## 2023-01-23 DIAGNOSIS — I10 ESSENTIAL HYPERTENSION: ICD-10-CM

## 2023-01-24 RX ORDER — VALSARTAN AND HYDROCHLOROTHIAZIDE 320; 25 MG/1; MG/1
1 TABLET, FILM COATED ORAL DAILY
Qty: 90 TABLET | Refills: 1 | Status: SHIPPED | OUTPATIENT
Start: 2023-01-24 | End: 2023-07-27

## 2023-01-24 RX ORDER — AMLODIPINE BESYLATE 5 MG/1
5 TABLET ORAL DAILY
Qty: 90 TABLET | Refills: 1 | Status: SHIPPED | OUTPATIENT
Start: 2023-01-24 | End: 2023-07-27

## 2023-01-24 NOTE — TELEPHONE ENCOUNTER
"Due to be seen with labs    Last Written Prescription Date:  7/20/22  Last Fill Quantity: 90,  # refills: 1   Last office visit provider:  1/25/22     Requested Prescriptions   Pending Prescriptions Disp Refills     valsartan-hydrochlorothiazide (DIOVAN HCT) 320-25 MG tablet 90 tablet 1     Sig: Take 1 tablet by mouth daily       Angiotensin-II Receptors Passed - 1/23/2023  9:26 AM        Passed - Last blood pressure under 140/90 in past 12 months     BP Readings from Last 3 Encounters:   01/25/22 128/86   04/16/21 116/72   12/14/20 132/76                 Passed - Recent (12 mo) or future (30 days) visit within the authorizing provider's specialty     Patient has had an office visit with the authorizing provider or a provider within the authorizing providers department within the previous 12 mos or has a future within next 30 days. See \"Patient Info\" tab in inbasket, or \"Choose Columns\" in Meds & Orders section of the refill encounter.              Passed - Medication is active on med list        Passed - Patient is age 18 or older        Passed - Normal serum creatinine on file in past 12 months     Recent Labs   Lab Test 01/25/22  0954   CR 1.02       Ok to refill medication if creatinine is low          Passed - Normal serum potassium on file in past 12 months     Recent Labs   Lab Test 01/25/22  0954   POTASSIUM 4.1                   Diuretics (Including Combos) Protocol Passed - 1/23/2023  9:26 AM        Passed - Blood pressure under 140/90 in past 12 months     BP Readings from Last 3 Encounters:   01/25/22 128/86   04/16/21 116/72   12/14/20 132/76                 Passed - Recent (12 mo) or future (30 days) visit within the authorizing provider's specialty     Patient has had an office visit with the authorizing provider or a provider within the authorizing providers department within the previous 12 mos or has a future within next 30 days. See \"Patient Info\" tab in inbasket, or \"Choose Columns\" in Meds & " Orders section of the refill encounter.              Passed - Medication is active on med list        Passed - Patient is age 18 or older        Passed - Normal serum creatinine on file in past 12 months     Recent Labs   Lab Test 01/25/22  0954   CR 1.02              Passed - Normal serum potassium on file in past 12 months     Recent Labs   Lab Test 01/25/22  0954   POTASSIUM 4.1                    Passed - Normal serum sodium on file in past 12 months     Recent Labs   Lab Test 01/25/22  0954                      Tamia Solorio, RN 01/23/23 8:00 PM

## 2023-01-24 NOTE — TELEPHONE ENCOUNTER
"Due to be seen with labs    Last Written Prescription Date:  7/20/22  Last Fill Quantity: 90,  # refills: 1   Last office visit provider:  1/25/22     Requested Prescriptions   Pending Prescriptions Disp Refills     amLODIPine (NORVASC) 5 MG tablet 90 tablet 1     Sig: Take 1 tablet (5 mg) by mouth daily       Calcium Channel Blockers Protocol  Passed - 1/23/2023  9:29 AM        Passed - Blood pressure under 140/90 in past 12 months     BP Readings from Last 3 Encounters:   01/25/22 128/86   04/16/21 116/72   12/14/20 132/76                 Passed - Recent (12 mo) or future (30 days) visit within the authorizing provider's specialty     Patient has had an office visit with the authorizing provider or a provider within the authorizing providers department within the previous 12 mos or has a future within next 30 days. See \"Patient Info\" tab in inbasket, or \"Choose Columns\" in Meds & Orders section of the refill encounter.              Passed - Medication is active on med list        Passed - Patient is age 18 or older        Passed - Normal serum creatinine on file in past 12 months     Recent Labs   Lab Test 01/25/22  0954   CR 1.02       Ok to refill medication if creatinine is low               Tamia Solorio, RN 01/23/23 7:58 PM  "

## 2023-01-31 ENCOUNTER — OFFICE VISIT (OUTPATIENT)
Dept: INTERNAL MEDICINE | Facility: CLINIC | Age: 63
End: 2023-01-31
Payer: COMMERCIAL

## 2023-01-31 VITALS
OXYGEN SATURATION: 97 % | DIASTOLIC BLOOD PRESSURE: 80 MMHG | BODY MASS INDEX: 31.36 KG/M2 | SYSTOLIC BLOOD PRESSURE: 138 MMHG | TEMPERATURE: 97.7 F | WEIGHT: 224 LBS | HEIGHT: 71 IN | RESPIRATION RATE: 18 BRPM | HEART RATE: 69 BPM

## 2023-01-31 DIAGNOSIS — Z86.0100 PERSONAL HISTORY OF COLONIC POLYPS: ICD-10-CM

## 2023-01-31 DIAGNOSIS — Z00.00 ANNUAL PHYSICAL EXAM: ICD-10-CM

## 2023-01-31 DIAGNOSIS — E78.5 HYPERLIPIDEMIA, UNSPECIFIED HYPERLIPIDEMIA TYPE: ICD-10-CM

## 2023-01-31 DIAGNOSIS — J45.21 MILD INTERMITTENT ASTHMA WITH EXACERBATION: Primary | ICD-10-CM

## 2023-01-31 DIAGNOSIS — R07.89 CHEST DISCOMFORT: ICD-10-CM

## 2023-01-31 DIAGNOSIS — Z12.5 SPECIAL SCREENING FOR MALIGNANT NEOPLASM OF PROSTATE: ICD-10-CM

## 2023-01-31 DIAGNOSIS — E66.811 CLASS 1 OBESITY DUE TO EXCESS CALORIES WITHOUT SERIOUS COMORBIDITY IN ADULT, UNSPECIFIED BMI: ICD-10-CM

## 2023-01-31 DIAGNOSIS — E66.09 CLASS 1 OBESITY DUE TO EXCESS CALORIES WITHOUT SERIOUS COMORBIDITY IN ADULT, UNSPECIFIED BMI: ICD-10-CM

## 2023-01-31 DIAGNOSIS — I10 ESSENTIAL HYPERTENSION: ICD-10-CM

## 2023-01-31 LAB
ALBUMIN SERPL BCG-MCNC: 4.5 G/DL (ref 3.5–5.2)
ALP SERPL-CCNC: 66 U/L (ref 40–129)
ALT SERPL W P-5'-P-CCNC: 25 U/L (ref 10–50)
ANION GAP SERPL CALCULATED.3IONS-SCNC: 12 MMOL/L (ref 7–15)
AST SERPL W P-5'-P-CCNC: 29 U/L (ref 10–50)
BILIRUB SERPL-MCNC: 0.5 MG/DL
BUN SERPL-MCNC: 11.5 MG/DL (ref 8–23)
CALCIUM SERPL-MCNC: 9.5 MG/DL (ref 8.8–10.2)
CHLORIDE SERPL-SCNC: 102 MMOL/L (ref 98–107)
CHOLEST SERPL-MCNC: 184 MG/DL
CREAT SERPL-MCNC: 1.03 MG/DL (ref 0.67–1.17)
DEPRECATED HCO3 PLAS-SCNC: 26 MMOL/L (ref 22–29)
GFR SERPL CREATININE-BSD FRML MDRD: 83 ML/MIN/1.73M2
GLUCOSE SERPL-MCNC: 104 MG/DL (ref 70–99)
HDLC SERPL-MCNC: 53 MG/DL
LDLC SERPL CALC-MCNC: 97 MG/DL
NONHDLC SERPL-MCNC: 131 MG/DL
POTASSIUM SERPL-SCNC: 4 MMOL/L (ref 3.4–5.3)
PROT SERPL-MCNC: 7.4 G/DL (ref 6.4–8.3)
PSA SERPL-MCNC: 0.49 NG/ML (ref 0–4.5)
SODIUM SERPL-SCNC: 140 MMOL/L (ref 136–145)
TRIGL SERPL-MCNC: 171 MG/DL

## 2023-01-31 PROCEDURE — 99396 PREV VISIT EST AGE 40-64: CPT | Mod: 25 | Performed by: NURSE PRACTITIONER

## 2023-01-31 PROCEDURE — 80053 COMPREHEN METABOLIC PANEL: CPT | Performed by: NURSE PRACTITIONER

## 2023-01-31 PROCEDURE — 90471 IMMUNIZATION ADMIN: CPT | Performed by: NURSE PRACTITIONER

## 2023-01-31 PROCEDURE — 90715 TDAP VACCINE 7 YRS/> IM: CPT | Performed by: NURSE PRACTITIONER

## 2023-01-31 PROCEDURE — 36415 COLL VENOUS BLD VENIPUNCTURE: CPT | Performed by: NURSE PRACTITIONER

## 2023-01-31 PROCEDURE — 90682 RIV4 VACC RECOMBINANT DNA IM: CPT | Performed by: NURSE PRACTITIONER

## 2023-01-31 PROCEDURE — 99214 OFFICE O/P EST MOD 30 MIN: CPT | Mod: 25 | Performed by: NURSE PRACTITIONER

## 2023-01-31 PROCEDURE — 80061 LIPID PANEL: CPT | Performed by: NURSE PRACTITIONER

## 2023-01-31 PROCEDURE — G0103 PSA SCREENING: HCPCS | Performed by: NURSE PRACTITIONER

## 2023-01-31 PROCEDURE — 90472 IMMUNIZATION ADMIN EACH ADD: CPT | Performed by: NURSE PRACTITIONER

## 2023-01-31 ASSESSMENT — ENCOUNTER SYMPTOMS
FEVER: 0
DIZZINESS: 0
PALPITATIONS: 0
ABDOMINAL PAIN: 0
NERVOUS/ANXIOUS: 0
HEADACHES: 0
EYE PAIN: 0
HEMATURIA: 0
FREQUENCY: 0
CONSTIPATION: 0
PARESTHESIAS: 0
DYSURIA: 0
SHORTNESS OF BREATH: 0
HEMATOCHEZIA: 0
CHILLS: 0
NAUSEA: 0
MYALGIAS: 0
WEAKNESS: 0
DIARRHEA: 0
SORE THROAT: 0
JOINT SWELLING: 0
ARTHRALGIAS: 0
COUGH: 0
HEARTBURN: 0

## 2023-01-31 ASSESSMENT — ASTHMA QUESTIONNAIRES
ACT_TOTALSCORE: 25
QUESTION_4 LAST FOUR WEEKS HOW OFTEN HAVE YOU USED YOUR RESCUE INHALER OR NEBULIZER MEDICATION (SUCH AS ALBUTEROL): NOT AT ALL
QUESTION_3 LAST FOUR WEEKS HOW OFTEN DID YOUR ASTHMA SYMPTOMS (WHEEZING, COUGHING, SHORTNESS OF BREATH, CHEST TIGHTNESS OR PAIN) WAKE YOU UP AT NIGHT OR EARLIER THAN USUAL IN THE MORNING: NOT AT ALL
ACT_TOTALSCORE: 25
QUESTION_1 LAST FOUR WEEKS HOW MUCH OF THE TIME DID YOUR ASTHMA KEEP YOU FROM GETTING AS MUCH DONE AT WORK, SCHOOL OR AT HOME: NONE OF THE TIME
QUESTION_2 LAST FOUR WEEKS HOW OFTEN HAVE YOU HAD SHORTNESS OF BREATH: NOT AT ALL
QUESTION_5 LAST FOUR WEEKS HOW WOULD YOU RATE YOUR ASTHMA CONTROL: COMPLETELY CONTROLLED

## 2023-01-31 NOTE — PROGRESS NOTES
SUBJECTIVE:   CC: Horace is an 62 year old who presents for preventative health visit.   Patient has been advised of split billing requirements and indicates understanding: Yes  Healthy Habits:     Getting at least 3 servings of Calcium per day:  NO    Bi-annual eye exam:  Yes    Dental care twice a year:  Yes    Sleep apnea or symptoms of sleep apnea:  None    Diet:  Regular (no restrictions)    Frequency of exercise:  6-7 days/week    Duration of exercise:  45-60 minutes    Taking medications regularly:  Yes    Medication side effects:  None    PHQ-2 Total Score: 0    Additional concerns today:  No      Today's PHQ-2 Score:   PHQ-2 ( 1999 [employer]) 1/31/2023   Q1: Little interest or pleasure in doing things 0   Q2: Feeling down, depressed or hopeless 0   PHQ-2 Score 0   Q1: Little interest or pleasure in doing things Not at all   Q2: Feeling down, depressed or hopeless Not at all   PHQ-2 Score 0           Social History     Tobacco Use     Smoking status: Never     Smokeless tobacco: Never   Substance Use Topics     Alcohol use: Yes     Alcohol/week: 17.0 - 24.0 standard drinks     Types: 10 Glasses of wine, 7 - 14 Standard drinks or equivalent per week     If you drink alcohol do you typically have >3 drinks per day or >7 drinks per week? Yes      Alcohol Use 1/31/2023   Prescreen: >3 drinks/day or >7 drinks/week? No   Prescreen: >3 drinks/day or >7 drinks/week? -   AUDIT SCORE  -       Last PSA:   Prostate Specific Antigen Screen   Date Value Ref Range Status   01/25/2022 0.44 0.00 - 4.50 ug/L Final       Reviewed orders with patient. Reviewed health maintenance and updated orders accordingly - Yes  Lab work is in process    Reviewed and updated as needed this visit by clinical staff   Tobacco  Allergies  Meds              Reviewed and updated as needed this visit by Provider                 Past Medical History:   Diagnosis Date     High cholesterol      Hyperlipidemia      Hypertension         Review of Systems  "  Constitutional: Negative for chills and fever.   HENT: Negative for congestion, ear pain, hearing loss and sore throat.    Eyes: Negative for pain and visual disturbance.   Respiratory: Negative for cough and shortness of breath.    Cardiovascular: Negative for chest pain, palpitations and peripheral edema.   Gastrointestinal: Negative for abdominal pain, constipation, diarrhea, heartburn, hematochezia and nausea.   Genitourinary: Negative for dysuria, frequency, genital sores, hematuria and urgency.   Musculoskeletal: Negative for arthralgias, joint swelling and myalgias.   Skin: Negative for rash.   Neurological: Negative for dizziness, weakness, headaches and paresthesias.   Psychiatric/Behavioral: Negative for mood changes. The patient is not nervous/anxious.      CONSTITUTIONAL: NEGATIVE for fever, chills, change in weight  INTEGUMENTARY/SKIN: NEGATIVE for worrisome rashes, moles or lesions  EYES: NEGATIVE for vision changes or irritation  ENT: NEGATIVE for ear, mouth and throat problems  RESP: NEGATIVE for significant cough or SOB  CV: NEGATIVE for chest pain, palpitations or peripheral edema  GI: NEGATIVE for nausea, abdominal pain, heartburn, or change in bowel habits   male: negative for dysuria, hematuria, decreased urinary stream, erectile dysfunction, urethral discharge  MUSCULOSKELETAL: NEGATIVE for significant arthralgias or myalgia  NEURO: NEGATIVE for weakness, dizziness or paresthesias  PSYCHIATRIC: NEGATIVE for changes in mood or affect    OBJECTIVE:   /80 (BP Location: Right arm, Patient Position: Sitting, Cuff Size: Adult Regular)   Pulse 69   Temp 97.7  F (36.5  C)   Resp 18   Ht 1.803 m (5' 11\")   Wt 101.6 kg (224 lb)   SpO2 97%   BMI 31.24 kg/m      Physical Exam  GENERAL: healthy, alert and no distress  NECK: no adenopathy, no asymmetry, masses, or scars and thyroid normal to palpation  RESP: lungs clear to auscultation - no rales, rhonchi or wheezes  CV: regular rate and " rhythm, normal S1 S2, no S3 or S4, no murmur, click or rub, no peripheral edema and peripheral pulses strong  ABDOMEN: soft, nontender, no hepatosplenomegaly, no masses and bowel sounds normal  MS: no gross musculoskeletal defects noted, no edema    Diagnostic Test Results:  Labs reviewed in Epic    ASSESSMENT/PLAN:     1. Annual physical exam  Generally healthy 62-year-old male    2. Chest discomfort  He had an episode of chest discomfort about a month ago and was seen in the ED where he had a negative work-up.  His symptoms did not recur.  Brief, temporary discomfort on the left chest after having some alcohol on an empty stomach.    I told him that if his symptoms were to recur that we could consider getting a stress test    3. Mild intermittent asthma with exacerbation  His asthma has been well controlled.  Rare use of albuterol    4. Essential hypertension  Controlled on amlodipine and Diovan    5. Hyperlipidemia, unspecified hyperlipidemia type  Continues on Livalo  - Lipid Profile (Chol, Trig, HDL, LDL calc); Future  - Comprehensive metabolic panel; Future  - Lipid Profile (Chol, Trig, HDL, LDL calc)  - Comprehensive metabolic panel    6. Class 1 obesity due to excess calories without serious comorbidity in adult, unspecified BMI  Weight is been stable    7. Personal history of colonic polyps  Due for colonoscopy in 2027    8. Special screening for malignant neoplasm of prostate  Check PSA today  - PSA, screen; Future  - PSA, screen    Patient Instructions   Blood pressure and other vital signs look good.    Weight is stable, goal 10 pound weight loss over the next year or so.    Colonoscopy up-to-date.    We will check a variety of labs today, results will come through on [employer].    If you have a recurrence of your chest discomfort, see me a [employer] message and we can order a stress test.      Patient has been advised of split billing requirements and indicates understanding: Yes      COUNSELING:   Reviewed  preventive health counseling, as reflected in patient instructions       Regular exercise       Healthy diet/nutrition       Vision screening       Hearing screening        He reports that he has never smoked. He has never used smokeless tobacco.        Primo Nielson CNP  [facility]

## 2023-01-31 NOTE — PATIENT INSTRUCTIONS
Blood pressure and other vital signs look good.    Weight is stable, goal 10 pound weight loss over the next year or so.    Colonoscopy up-to-date.    We will check a variety of labs today, results will come through on [employer].    If you have a recurrence of your chest discomfort, see me a [employer] message and we can order a stress test.

## 2023-07-27 DIAGNOSIS — I10 ESSENTIAL HYPERTENSION: ICD-10-CM

## 2023-07-27 RX ORDER — AMLODIPINE BESYLATE 5 MG/1
TABLET ORAL
Qty: 90 TABLET | Refills: 1 | Status: SHIPPED | OUTPATIENT
Start: 2023-07-27 | End: 2024-02-06

## 2023-07-27 RX ORDER — VALSARTAN AND HYDROCHLOROTHIAZIDE 320; 25 MG/1; MG/1
1 TABLET, FILM COATED ORAL DAILY
Qty: 90 TABLET | Refills: 1 | Status: SHIPPED | OUTPATIENT
Start: 2023-07-27 | End: 2024-02-06

## 2023-07-27 NOTE — TELEPHONE ENCOUNTER
"Last Written Prescription Date:  1/24/2023  Last Fill Quantity: 90,  # refills: 1   Last office visit provider:  1/31/2023     Requested Prescriptions   Pending Prescriptions Disp Refills    valsartan-hydrochlorothiazide (DIOVAN HCT) 320-25 MG tablet [Pharmacy Med Name: VALSARTAN/HCTZ 320MG/25MG TABLETS] 90 tablet 1     Sig: TAKE 1 TABLET BY MOUTH DAILY       Angiotensin-II Receptors Passed - 7/27/2023 11:24 AM        Passed - Last blood pressure under 140/90 in past 12 months     BP Readings from Last 3 Encounters:   01/31/23 138/80   01/25/22 128/86   04/16/21 116/72                 Passed - Recent (12 mo) or future (30 days) visit within the authorizing provider's specialty     Patient has had an office visit with the authorizing provider or a provider within the authorizing providers department within the previous 12 mos or has a future within next 30 days. See \"Patient Info\" tab in [email or web address], or \"Choose Columns\" in Meds & Orders section of the refill encounter.              Passed - Medication is active on med list        Passed - Patient is age 18 or older        Passed - Normal serum creatinine on file in past 12 months     Recent Labs   Lab Test 01/31/23  1123   CR 1.03       Ok to refill medication if creatinine is low          Passed - Normal serum potassium on file in past 12 months     Recent Labs   Lab Test 01/31/23  1123   POTASSIUM 4.0                   Diuretics (Including Combos) Protocol Passed - 7/27/2023 11:24 AM        Passed - Blood pressure under 140/90 in past 12 months     BP Readings from Last 3 Encounters:   01/31/23 138/80   01/25/22 128/86   04/16/21 116/72                 Passed - Recent (12 mo) or future (30 days) visit within the authorizing provider's specialty     Patient has had an office visit with the authorizing provider or a provider within the authorizing providers department within the previous 12 mos or has a future within next 30 days. See \"Patient Info\" tab in [email or web address], or " "\"Choose Columns\" in Meds & Orders section of the refill encounter.              Passed - Medication is active on med list        Passed - Patient is age 18 or older        Passed - Normal serum creatinine on file in past 12 months     Recent Labs   Lab Test 01/31/23  1123   CR 1.03              Passed - Normal serum potassium on file in past 12 months     Recent Labs   Lab Test 01/31/23  1123   POTASSIUM 4.0                    Passed - Normal serum sodium on file in past 12 months     Recent Labs   Lab Test 01/31/23  1123                   Last Written Prescription Date:  1/24/2023  Last Fill Quantity: 90,  # refills: 1       amLODIPine (NORVASC) 5 MG tablet [Pharmacy Med Name: AMLODIPINE BESYLATE 5MG TABLETS] 90 tablet 1     Sig: TAKE 1 TABLET(5 MG) BY MOUTH DAILY       Calcium Channel Blockers Protocol  Passed - 7/27/2023 11:24 AM        Passed - Blood pressure under 140/90 in past 12 months     BP Readings from Last 3 Encounters:   01/31/23 138/80   01/25/22 128/86   04/16/21 116/72                 Passed - Recent (12 mo) or future (30 days) visit within the authorizing provider's specialty     Patient has had an office visit with the authorizing provider or a provider within the authorizing providers department within the previous 12 mos or has a future within next 30 days. See \"Patient Info\" tab in inbasket, or \"Choose Columns\" in Meds & Orders section of the refill encounter.              Passed - Medication is active on med list        Passed - Patient is age 18 or older        Passed - Normal serum creatinine on file in past 12 months     Recent Labs   Lab Test 01/31/23  1123   CR 1.03       Ok to refill medication if creatinine is low               Samanta Vargas RN 07/27/23 3:56 PM  "

## 2023-11-29 ENCOUNTER — PATIENT OUTREACH (OUTPATIENT)
Dept: GASTROENTEROLOGY | Facility: CLINIC | Age: 63
End: 2023-11-29
Payer: COMMERCIAL

## 2024-02-06 DIAGNOSIS — I10 ESSENTIAL HYPERTENSION: ICD-10-CM

## 2024-02-06 RX ORDER — AMLODIPINE BESYLATE 5 MG/1
TABLET ORAL
Qty: 90 TABLET | Refills: 1 | Status: SHIPPED | OUTPATIENT
Start: 2024-02-06 | End: 2024-08-08

## 2024-02-06 RX ORDER — VALSARTAN AND HYDROCHLOROTHIAZIDE 320; 25 MG/1; MG/1
1 TABLET, FILM COATED ORAL DAILY
Qty: 90 TABLET | Refills: 1 | Status: SHIPPED | OUTPATIENT
Start: 2024-02-06 | End: 2024-08-08

## 2024-02-07 ENCOUNTER — OFFICE VISIT (OUTPATIENT)
Dept: INTERNAL MEDICINE | Facility: CLINIC | Age: 64
End: 2024-02-07
Payer: COMMERCIAL

## 2024-02-07 VITALS
WEIGHT: 218 LBS | BODY MASS INDEX: 30.52 KG/M2 | RESPIRATION RATE: 16 BRPM | DIASTOLIC BLOOD PRESSURE: 66 MMHG | SYSTOLIC BLOOD PRESSURE: 108 MMHG | HEART RATE: 78 BPM | OXYGEN SATURATION: 96 % | HEIGHT: 71 IN | TEMPERATURE: 98.1 F

## 2024-02-07 DIAGNOSIS — J45.20 MILD INTERMITTENT ASTHMA WITHOUT COMPLICATION: ICD-10-CM

## 2024-02-07 DIAGNOSIS — Z00.00 ANNUAL PHYSICAL EXAM: ICD-10-CM

## 2024-02-07 DIAGNOSIS — I10 ESSENTIAL HYPERTENSION: ICD-10-CM

## 2024-02-07 DIAGNOSIS — Z12.5 SPECIAL SCREENING FOR MALIGNANT NEOPLASM OF PROSTATE: ICD-10-CM

## 2024-02-07 DIAGNOSIS — E78.49 OTHER HYPERLIPIDEMIA: Primary | ICD-10-CM

## 2024-02-07 DIAGNOSIS — Z86.0100 PERSONAL HISTORY OF COLONIC POLYPS: ICD-10-CM

## 2024-02-07 PROBLEM — J45.21 MILD INTERMITTENT ASTHMA WITH EXACERBATION: Status: RESOLVED | Noted: 2021-04-16 | Resolved: 2024-02-07

## 2024-02-07 LAB
ALBUMIN SERPL BCG-MCNC: 4.3 G/DL (ref 3.5–5.2)
ALP SERPL-CCNC: 64 U/L (ref 40–150)
ALT SERPL W P-5'-P-CCNC: 31 U/L (ref 0–70)
ANION GAP SERPL CALCULATED.3IONS-SCNC: 10 MMOL/L (ref 7–15)
AST SERPL W P-5'-P-CCNC: 32 U/L (ref 0–45)
BILIRUB SERPL-MCNC: 0.6 MG/DL
BUN SERPL-MCNC: 15.2 MG/DL (ref 8–23)
CALCIUM SERPL-MCNC: 9.3 MG/DL (ref 8.8–10.2)
CHLORIDE SERPL-SCNC: 102 MMOL/L (ref 98–107)
CHOLEST SERPL-MCNC: 127 MG/DL
CREAT SERPL-MCNC: 1.1 MG/DL (ref 0.67–1.17)
DEPRECATED HCO3 PLAS-SCNC: 28 MMOL/L (ref 22–29)
EGFRCR SERPLBLD CKD-EPI 2021: 76 ML/MIN/1.73M2
FASTING STATUS PATIENT QL REPORTED: YES
GLUCOSE SERPL-MCNC: 114 MG/DL (ref 70–99)
HDLC SERPL-MCNC: 40 MG/DL
LDLC SERPL CALC-MCNC: 57 MG/DL
NONHDLC SERPL-MCNC: 87 MG/DL
POTASSIUM SERPL-SCNC: 3.4 MMOL/L (ref 3.4–5.3)
PROT SERPL-MCNC: 7.1 G/DL (ref 6.4–8.3)
PSA SERPL DL<=0.01 NG/ML-MCNC: 0.39 NG/ML (ref 0–4.5)
SODIUM SERPL-SCNC: 140 MMOL/L (ref 135–145)
TRIGL SERPL-MCNC: 148 MG/DL

## 2024-02-07 PROCEDURE — 90677 PCV20 VACCINE IM: CPT | Performed by: NURSE PRACTITIONER

## 2024-02-07 PROCEDURE — G0103 PSA SCREENING: HCPCS | Performed by: NURSE PRACTITIONER

## 2024-02-07 PROCEDURE — 90472 IMMUNIZATION ADMIN EACH ADD: CPT | Performed by: NURSE PRACTITIONER

## 2024-02-07 PROCEDURE — 36415 COLL VENOUS BLD VENIPUNCTURE: CPT | Performed by: NURSE PRACTITIONER

## 2024-02-07 PROCEDURE — 90686 IIV4 VACC NO PRSV 0.5 ML IM: CPT | Performed by: NURSE PRACTITIONER

## 2024-02-07 PROCEDURE — 90471 IMMUNIZATION ADMIN: CPT | Performed by: NURSE PRACTITIONER

## 2024-02-07 PROCEDURE — 99214 OFFICE O/P EST MOD 30 MIN: CPT | Mod: 25 | Performed by: NURSE PRACTITIONER

## 2024-02-07 PROCEDURE — 99396 PREV VISIT EST AGE 40-64: CPT | Mod: 25 | Performed by: NURSE PRACTITIONER

## 2024-02-07 PROCEDURE — 80061 LIPID PANEL: CPT | Performed by: NURSE PRACTITIONER

## 2024-02-07 PROCEDURE — 80053 COMPREHEN METABOLIC PANEL: CPT | Performed by: NURSE PRACTITIONER

## 2024-02-07 SDOH — HEALTH STABILITY: PHYSICAL HEALTH: ON AVERAGE, HOW MANY DAYS PER WEEK DO YOU ENGAGE IN MODERATE TO STRENUOUS EXERCISE (LIKE A BRISK WALK)?: 6 DAYS

## 2024-02-07 SDOH — HEALTH STABILITY: PHYSICAL HEALTH: ON AVERAGE, HOW MANY MINUTES DO YOU ENGAGE IN EXERCISE AT THIS LEVEL?: 60 MIN

## 2024-02-07 ASSESSMENT — ASTHMA QUESTIONNAIRES
QUESTION_1 LAST FOUR WEEKS HOW MUCH OF THE TIME DID YOUR ASTHMA KEEP YOU FROM GETTING AS MUCH DONE AT WORK, SCHOOL OR AT HOME: NONE OF THE TIME
QUESTION_2 LAST FOUR WEEKS HOW OFTEN HAVE YOU HAD SHORTNESS OF BREATH: NOT AT ALL
ACT_TOTALSCORE: 25
QUESTION_5 LAST FOUR WEEKS HOW WOULD YOU RATE YOUR ASTHMA CONTROL: COMPLETELY CONTROLLED
QUESTION_4 LAST FOUR WEEKS HOW OFTEN HAVE YOU USED YOUR RESCUE INHALER OR NEBULIZER MEDICATION (SUCH AS ALBUTEROL): NOT AT ALL
QUESTION_3 LAST FOUR WEEKS HOW OFTEN DID YOUR ASTHMA SYMPTOMS (WHEEZING, COUGHING, SHORTNESS OF BREATH, CHEST TIGHTNESS OR PAIN) WAKE YOU UP AT NIGHT OR EARLIER THAN USUAL IN THE MORNING: NOT AT ALL
ACT_TOTALSCORE: 25

## 2024-02-07 ASSESSMENT — SOCIAL DETERMINANTS OF HEALTH (SDOH): HOW OFTEN DO YOU GET TOGETHER WITH FRIENDS OR RELATIVES?: MORE THAN THREE TIMES A WEEK

## 2024-02-07 NOTE — PROGRESS NOTES
Preventive Care Visit  [facility]  Primo Nielson, CNP, Nurse Practitioner - Family  Feb 7, 2024    Assessment & Plan     Annual physical exam  Horace is doing well.  He is enjoying [facility].  He likes to go bowling about 2 times per week.  He is retired from construction    Other hyperlipidemia  Continues on low Vallo.  He did have a coronary artery calcium score of 0 in 2022.  Family history of ischemic heart disease.  - Lipid Profile (Chol, Trig, HDL, LDL calc); Future  - Comprehensive metabolic panel; Future    Essential hypertension  Controlled on Diovan  - Comprehensive metabolic panel; Future    Personal history of colonic polyps  Due for colonoscopy in 2028    Mild intermittent asthma without complication  Controlled with rare use of albuterol    Special screening for malignant neoplasm of prostate  - PSA, screen; Future    Patient Instructions   Blood pressure and other vital signs look good.    Weight is stable.    Pneumonia and influenza shots today.    Colonoscopy due in 2028.    Labs today.  Results come through [facility].    Follow-up in 1 year, sooner if needed      Counseling  Appropriate preventive services were discussed with this patient, including applicable screening as appropriate for fall prevention, nutrition, physical activity, Tobacco-use cessation, weight loss and cognition.  Checklist reviewing preventive services available has been given to the patient.  Reviewed patient's diet, addressing concerns and/or questions.   The patient reports drinking more than one alcoholic drink per day and sometimes engages in binge or excessive drinking. The patient was counseled and given information about possible harmful effects of excessive alcohol intake as well as where to get help for alcohol problems.   Patient has been advised of split billing requirements and indicates understanding: Yes    See Patient Instructions    Subjective   Horace is a 63 year old, presenting for the  following:  Physical (fasting)        2/7/2024     8:13 AM   Additional Questions   Roomed by Petra SCOTT        Health Care Directive  Patient does not have a Health Care Directive or Living Will: Patient states has Advance Directive and will bring in a copy to clinic.    HPI  Here for physical exam        2/7/2024   General Health   How would you rate your overall physical health? Excellent   Feel stress (tense, anxious, or unable to sleep) Not at all         2/7/2024   Nutrition   Three or more servings of calcium each day? (!) NO   Diet: Regular (no restrictions)   How many servings of fruit and vegetables per day? (!) 2-3   How many sweetened beverages each day? 0-1         2/7/2024   Exercise   Days per week of moderate/strenous exercise 6 days   Average minutes spent exercising at this level 60 min         2/7/2024   Social Factors   Frequency of gathering with friends or relatives More than three times a week   Worry food won't last until get money to buy more No   Food not last or not have enough money for food? No   Do you have housing?  Yes   Are you worried about losing your housing? No   Lack of transportation? No   Unable to get utilities (heat,electricity)? No         2/7/2024   Fall Risk   Fallen 2 or more times in the past year? No   Trouble with walking or balance? No          2/7/2024   Dental   Dentist two times every year? Yes         2/7/2024   TB Screening   Were you born outside of [place]?  No         Today's PHQ-2 Score:       2/7/2024     8:11 AM   PHQ-2 ( 1999 Pfizer)   Q1: Little interest or pleasure in doing things 0   Q2: Feeling down, depressed or hopeless 0   PHQ-2 Score 0   Q1: Little interest or pleasure in doing things Not at all   Q2: Feeling down, depressed or hopeless Not at all   PHQ-2 Score 0           2/7/2024   Substance Use   Alcohol more than 3/day or more than 7/wk Yes   How often do you have a drink containing alcohol 4 or more times a week   How many alcohol drinks on typical  day 1 or 2   How often do you have 5+ drinks at one occasion Never   Audit 2/3 Score 0   How often not able to stop drinking once started Never   How often failed to do what normally expected Never   How often needed first drink in am after a heavy drinking session Never   How often feeling of guilt or remorse after drinking Never   How often unable to remember what happened the night before Never   Have you or someone else been injured because of your drinking No   Has anyone been concerned or suggested you cut down on drinking No   TOTAL SCORE - AUDIT 4   Do you use any other substances recreationally? (!) ALCOHOL     Social History     Tobacco Use    Smoking status: Never     Passive exposure: Never    Smokeless tobacco: Never   Vaping Use    Vaping Use: Never used   Substance Use Topics    Alcohol use: Yes     Alcohol/week: 17.0 - 24.0 standard drinks of alcohol     Types: 10 Glasses of wine, 7 - 14 Standard drinks or equivalent per week    Drug use: Never           2/7/2024   STI Screening   New sexual partner(s) since last STI/HIV test? No   Last PSA:   Prostate Specific Antigen Screen   Date Value Ref Range Status   01/31/2023 0.49 0.00 - 4.50 ng/mL Final   01/25/2022 0.44 0.00 - 4.50 ug/L Final     The 10-year ASCVD risk score (Diane COLVIN, et al., 2019) is: 7.9%    Values used to calculate the score:      Age: 63 years      Sex: Male      Is Non- : No      Diabetic: No      Tobacco smoker: No      Systolic Blood Pressure: 108 mmHg      Is BP treated: Yes      HDL Cholesterol: 53 mg/dL      Total Cholesterol: 184 mg/dL           Reviewed and updated as needed this visit by Provider                    Past Medical History:   Diagnosis Date    High cholesterol     Hyperlipidemia     Hypertension      Past Surgical History:   Procedure Laterality Date    ARTHROSCOPY KNEE      ORTHOPEDIC SURGERY      left and right knee     Review of Systems    Review of Systems  Constitutional, HEENT,  "cardiovascular, pulmonary, gi and gu systems are negative, except as otherwise noted.     Objective    Exam  /66 (BP Location: Right arm, Patient Position: Sitting, Cuff Size: Adult Regular)   Pulse 78   Temp 98.1  F (36.7  C) (Oral)   Resp 16   Ht 1.803 m (5' 11\")   Wt 98.9 kg (218 lb)   SpO2 96%   BMI 30.40 kg/m     Estimated body mass index is 30.4 kg/m  as calculated from the following:    Height as of this encounter: 1.803 m (5' 11\").    Weight as of this encounter: 98.9 kg (218 lb).    Physical Exam  GENERAL: alert and no distress  NECK: no adenopathy, no asymmetry, masses, or scars  RESP: lungs clear to auscultation - no rales, rhonchi or wheezes  CV: regular rate and rhythm, normal S1 S2, no S3 or S4, no murmur, click or rub, no peripheral edema  ABDOMEN: soft, nontender, no hepatosplenomegaly, no masses and bowel sounds normal  MS: no gross musculoskeletal defects noted, no edema      Signed Electronically by: Primo Nielson, CNP    "

## 2024-02-07 NOTE — PATIENT INSTRUCTIONS
Blood pressure and other vital signs look good.    Weight is stable.    Pneumonia and influenza shots today.    Colonoscopy due in 2028.    Labs today.  Results come through [facility].    Follow-up in 1 year, sooner if needed

## 2024-02-08 ENCOUNTER — MYC MEDICAL ADVICE (OUTPATIENT)
Dept: INTERNAL MEDICINE | Facility: CLINIC | Age: 64
End: 2024-02-08
Payer: COMMERCIAL

## 2024-03-01 DIAGNOSIS — I25.10 CORONARY ARTERY DISEASE INVOLVING NATIVE CORONARY ARTERY OF NATIVE HEART WITHOUT ANGINA PECTORIS: ICD-10-CM

## 2024-03-01 RX ORDER — PITAVASTATIN CALCIUM 4.18 MG/1
TABLET, FILM COATED ORAL
Qty: 90 TABLET | Refills: 2 | Status: SHIPPED | OUTPATIENT
Start: 2024-03-01

## 2024-08-08 DIAGNOSIS — I10 ESSENTIAL HYPERTENSION: ICD-10-CM

## 2024-08-08 RX ORDER — VALSARTAN AND HYDROCHLOROTHIAZIDE 320; 25 MG/1; MG/1
1 TABLET, FILM COATED ORAL DAILY
Qty: 90 TABLET | Refills: 1 | Status: SHIPPED | OUTPATIENT
Start: 2024-08-08

## 2024-08-08 RX ORDER — AMLODIPINE BESYLATE 5 MG/1
TABLET ORAL
Qty: 90 TABLET | Refills: 1 | Status: SHIPPED | OUTPATIENT
Start: 2024-08-08

## 2024-12-01 DIAGNOSIS — I25.10 CORONARY ARTERY DISEASE INVOLVING NATIVE CORONARY ARTERY OF NATIVE HEART WITHOUT ANGINA PECTORIS: ICD-10-CM

## 2024-12-02 RX ORDER — PITAVASTATIN CALCIUM 4.18 MG/1
TABLET, FILM COATED ORAL
Qty: 90 TABLET | Refills: 0 | Status: SHIPPED | OUTPATIENT
Start: 2024-12-02

## 2025-02-05 ENCOUNTER — TRANSFERRED RECORDS (OUTPATIENT)
Dept: HEALTH INFORMATION MANAGEMENT | Facility: CLINIC | Age: 65
End: 2025-02-05
Payer: COMMERCIAL

## 2025-03-04 DIAGNOSIS — I25.10 CORONARY ARTERY DISEASE INVOLVING NATIVE CORONARY ARTERY OF NATIVE HEART WITHOUT ANGINA PECTORIS: ICD-10-CM

## 2025-03-04 RX ORDER — PITAVASTATIN CALCIUM 4.18 MG/1
TABLET, FILM COATED ORAL
Qty: 90 TABLET | Refills: 0 | Status: SHIPPED | OUTPATIENT
Start: 2025-03-04

## 2025-03-25 ENCOUNTER — OFFICE VISIT (OUTPATIENT)
Dept: INTERNAL MEDICINE | Facility: CLINIC | Age: 65
End: 2025-03-25
Payer: COMMERCIAL

## 2025-03-25 ENCOUNTER — TELEPHONE (OUTPATIENT)
Dept: INTERNAL MEDICINE | Facility: CLINIC | Age: 65
End: 2025-03-25

## 2025-03-25 VITALS
WEIGHT: 215 LBS | HEART RATE: 65 BPM | RESPIRATION RATE: 16 BRPM | DIASTOLIC BLOOD PRESSURE: 68 MMHG | OXYGEN SATURATION: 97 % | HEIGHT: 71 IN | SYSTOLIC BLOOD PRESSURE: 126 MMHG | BODY MASS INDEX: 30.1 KG/M2 | TEMPERATURE: 97.7 F

## 2025-03-25 DIAGNOSIS — Z12.5 SCREENING FOR PROSTATE CANCER: ICD-10-CM

## 2025-03-25 DIAGNOSIS — I10 ESSENTIAL HYPERTENSION: Primary | ICD-10-CM

## 2025-03-25 DIAGNOSIS — Z13.1 SCREENING FOR DIABETES MELLITUS: ICD-10-CM

## 2025-03-25 DIAGNOSIS — G89.29 CHRONIC LEFT SHOULDER PAIN: ICD-10-CM

## 2025-03-25 DIAGNOSIS — J45.20 MILD INTERMITTENT ASTHMA WITHOUT COMPLICATION: ICD-10-CM

## 2025-03-25 DIAGNOSIS — Z00.00 ANNUAL PHYSICAL EXAM: ICD-10-CM

## 2025-03-25 DIAGNOSIS — Z82.49 FAMILY HISTORY OF ISCHEMIC HEART DISEASE: ICD-10-CM

## 2025-03-25 DIAGNOSIS — M25.512 CHRONIC LEFT SHOULDER PAIN: ICD-10-CM

## 2025-03-25 DIAGNOSIS — Z86.0100 PERSONAL HISTORY OF COLON POLYPS, UNSPECIFIED: ICD-10-CM

## 2025-03-25 DIAGNOSIS — R73.03 PREDIABETES: Primary | ICD-10-CM

## 2025-03-25 DIAGNOSIS — E78.5 HYPERLIPIDEMIA, UNSPECIFIED HYPERLIPIDEMIA TYPE: ICD-10-CM

## 2025-03-25 LAB
ALBUMIN SERPL BCG-MCNC: 4.5 G/DL (ref 3.5–5.2)
ALP SERPL-CCNC: 64 U/L (ref 40–150)
ALT SERPL W P-5'-P-CCNC: 21 U/L (ref 0–70)
ANION GAP SERPL CALCULATED.3IONS-SCNC: 10 MMOL/L (ref 7–15)
AST SERPL W P-5'-P-CCNC: 27 U/L (ref 0–45)
BILIRUB SERPL-MCNC: 0.7 MG/DL
BUN SERPL-MCNC: 19.1 MG/DL (ref 8–23)
CALCIUM SERPL-MCNC: 10 MG/DL (ref 8.8–10.4)
CHLORIDE SERPL-SCNC: 102 MMOL/L (ref 98–107)
CHOLEST SERPL-MCNC: 183 MG/DL
CREAT SERPL-MCNC: 0.95 MG/DL (ref 0.67–1.17)
EGFRCR SERPLBLD CKD-EPI 2021: 90 ML/MIN/1.73M2
EST. AVERAGE GLUCOSE BLD GHB EST-MCNC: 120 MG/DL
FASTING STATUS PATIENT QL REPORTED: YES
FASTING STATUS PATIENT QL REPORTED: YES
GLUCOSE SERPL-MCNC: 119 MG/DL (ref 70–99)
HBA1C MFR BLD: 5.8 % (ref 0–5.6)
HCO3 SERPL-SCNC: 28 MMOL/L (ref 22–29)
HDLC SERPL-MCNC: 53 MG/DL
LDLC SERPL CALC-MCNC: 94 MG/DL
NONHDLC SERPL-MCNC: 130 MG/DL
POTASSIUM SERPL-SCNC: 4.3 MMOL/L (ref 3.4–5.3)
PROT SERPL-MCNC: 7.6 G/DL (ref 6.4–8.3)
PSA SERPL DL<=0.01 NG/ML-MCNC: 0.39 NG/ML (ref 0–4.5)
SODIUM SERPL-SCNC: 140 MMOL/L (ref 135–145)
TRIGL SERPL-MCNC: 179 MG/DL

## 2025-03-25 PROCEDURE — G0103 PSA SCREENING: HCPCS | Performed by: NURSE PRACTITIONER

## 2025-03-25 PROCEDURE — 80061 LIPID PANEL: CPT | Performed by: NURSE PRACTITIONER

## 2025-03-25 PROCEDURE — 83036 HEMOGLOBIN GLYCOSYLATED A1C: CPT | Performed by: NURSE PRACTITIONER

## 2025-03-25 PROCEDURE — 36415 COLL VENOUS BLD VENIPUNCTURE: CPT | Performed by: NURSE PRACTITIONER

## 2025-03-25 PROCEDURE — 80053 COMPREHEN METABOLIC PANEL: CPT | Performed by: NURSE PRACTITIONER

## 2025-03-25 SDOH — HEALTH STABILITY: PHYSICAL HEALTH: ON AVERAGE, HOW MANY MINUTES DO YOU ENGAGE IN EXERCISE AT THIS LEVEL?: 80 MIN

## 2025-03-25 SDOH — HEALTH STABILITY: PHYSICAL HEALTH: ON AVERAGE, HOW MANY DAYS PER WEEK DO YOU ENGAGE IN MODERATE TO STRENUOUS EXERCISE (LIKE A BRISK WALK)?: 6 DAYS

## 2025-03-25 ASSESSMENT — ASTHMA QUESTIONNAIRES
QUESTION_5 LAST FOUR WEEKS HOW WOULD YOU RATE YOUR ASTHMA CONTROL: COMPLETELY CONTROLLED
QUESTION_1 LAST FOUR WEEKS HOW MUCH OF THE TIME DID YOUR ASTHMA KEEP YOU FROM GETTING AS MUCH DONE AT WORK, SCHOOL OR AT HOME: NONE OF THE TIME
QUESTION_3 LAST FOUR WEEKS HOW OFTEN DID YOUR ASTHMA SYMPTOMS (WHEEZING, COUGHING, SHORTNESS OF BREATH, CHEST TIGHTNESS OR PAIN) WAKE YOU UP AT NIGHT OR EARLIER THAN USUAL IN THE MORNING: NOT AT ALL
QUESTION_2 LAST FOUR WEEKS HOW OFTEN HAVE YOU HAD SHORTNESS OF BREATH: NOT AT ALL
QUESTION_4 LAST FOUR WEEKS HOW OFTEN HAVE YOU USED YOUR RESCUE INHALER OR NEBULIZER MEDICATION (SUCH AS ALBUTEROL): NOT AT ALL
ACT_TOTALSCORE: 25

## 2025-03-25 ASSESSMENT — SOCIAL DETERMINANTS OF HEALTH (SDOH): HOW OFTEN DO YOU GET TOGETHER WITH FRIENDS OR RELATIVES?: THREE TIMES A WEEK

## 2025-03-25 NOTE — PROGRESS NOTES
Preventive Care Visit  [facility]  Primo Nielson, CNP, Nurse Practitioner - Family  Mar 25, 2025      Assessment & Plan     Annual physical exam  In general Horace is doing well.  He is enjoying his jail.  He is a former business owner, having done residential/municipal construction    Shoulder pain, left  He saw [facility] for his left shoulder pain last winter.  They did an x-ray and told him that he had impingement syndrome.  The pain persists despite home PT exercises.  I suspect he has rotator cuff tendinopathy.  He likely needs an MRI.  I told him if Ortho will not order the MRI for him at his follow-up visit that I could place the order for him    Essential hypertension  Controlled on amlodipine and Diovan  - Comprehensive metabolic panel; Future    Hyperlipidemia, unspecified hyperlipidemia type  CT coronary calcium score of 0 in 2022.  Family history of ischemic heart disease, father suffered an MI at age 68  - Lipid panel reflex to direct LDL Non-fasting; Future  - Comprehensive metabolic panel; Future    Mild intermittent asthma without complication  Controlled.  Rare use of albuterol    Family history of ischemic heart disease (father age 68)  As above    Personal history of colon polyps, unspecified  Colonoscopy due 2028    Screening for prostate cancer  - PSA, screen; Future    Screening for diabetes mellitus  - Hemoglobin A1c; Future    Patient Instructions   Lets plan on repeating the CT coronary calcium scan in 2027.    Colonoscopy due in 2028.    Check PSA today.    Update labs, including hemoglobin A1c.    Blood pressure looks good today.    Stay physically active with regular exercise.    Consider Shingrix shingles vaccine at some point in the future    The longitudinal plan of care for the diagnosis(es)/condition(s) as documented were addressed during this visit. Due to the added complexity in care, I will continue to support Horace in the subsequent management and with  "ongoing continuity of care.                BMI  Estimated body mass index is 29.99 kg/m  as calculated from the following:    Height as of this encounter: 1.803 m (5' 11\").    Weight as of this encounter: 97.5 kg (215 lb).       Counseling  Appropriate preventive services were addressed with this patient via screening, questionnaire, or discussion as appropriate for fall prevention, nutrition, physical activity, Tobacco-use cessation, social engagement, weight loss and cognition.  Checklist reviewing preventive services available has been given to the patient.  Reviewed patient's diet, addressing concerns and/or questions.   The patient reports drinking more than 3 alcoholic drinks per day and/or more than 7 drhnks per week. The patient was counseled and given information about possible harmful effects of excessive alcohol intake.        Marta Vu is a 64 year old, presenting for the following:  Physical (Fasting - Left shoulder pain, can't sleep on left side, went to PT with no improvement)        3/25/2025     8:39 AM   Additional Questions   Roomed by Tracy WHALEN  Here for annual physical.         Advance Care Planning  Patient does not have a Health Care Directive: Discussed advance care planning with patient; information given to patient to review.      3/25/2025   General Health   How would you rate your overall physical health? Good   Feel stress (tense, anxious, or unable to sleep) Not at all         3/25/2025   Nutrition   Three or more servings of calcium each day? Yes   Diet: I don't know   How many servings of fruit and vegetables per day? (!) 2-3   How many sweetened beverages each day? 0-1         3/25/2025   Exercise   Days per week of moderate/strenous exercise 6 days   Average minutes spent exercising at this level 80 min         3/25/2025   Social Factors   Frequency of gathering with friends or relatives Three times a week   Worry food won't last until get money to buy more No "   Food not last or not have enough money for food? No   Do you have housing? (Housing is defined as stable permanent housing and does not include staying ouside in a car, in a tent, in an abandoned building, in an overnight shelter, or couch-surfing.) Yes   Are you worried about losing your housing? No   Lack of transportation? No   Unable to get utilities (heat,electricity)? No         3/25/2025   Fall Risk   Fallen 2 or more times in the past year? No   Trouble with walking or balance? No          3/25/2025   Dental   Dentist two times every year? Yes           2/7/2024   TB Screening   Were you born outside of the [place]? No           Today's PHQ-2 Score:       3/25/2025     8:36 AM   PHQ-2 ( 1999 [employer])   Q1: Little interest or pleasure in doing things 0   Q2: Feeling down, depressed or hopeless 0   PHQ-2 Score 0    Q1: Little interest or pleasure in doing things Not at all   Q2: Feeling down, depressed or hopeless Not at all   PHQ-2 Score 0       Patient-reported           3/25/2025   Substance Use   Alcohol more than 3/day or more than 7/wk Yes   How often do you have a drink containing alcohol 4 or more times a week   How many alcohol drinks on typical day 1 or 2   How often do you have 5+ drinks at one occasion Never   Audit 2/3 Score 0   How often not able to stop drinking once started Never   How often failed to do what normally expected Never   How often needed first drink in am after a heavy drinking session Never   How often feeling of guilt or remorse after drinking Never   How often unable to remember what happened the night before Never   Have you or someone else been injured because of your drinking No   Has anyone been concerned or suggested you cut down on drinking No   TOTAL SCORE - AUDIT 4   Do you use any other substances recreationally? No     Social History     Tobacco Use    Smoking status: Never     Passive exposure: Never    Smokeless tobacco: Never   Vaping Use    Vaping status: Never Used  "  Substance Use Topics    Alcohol use: Yes     Alcohol/week: 17.0 - 24.0 standard drinks of alcohol     Types: 10 Glasses of wine, 7 - 14 Standard drinks or equivalent per week    Drug use: Never           3/25/2025   STI Screening   New sexual partner(s) since last STI/HIV test? No   Last PSA:   Prostate Specific Antigen Screen   Date Value Ref Range Status   02/07/2024 0.39 0.00 - 4.50 ng/mL Final   01/25/2022 0.44 0.00 - 4.50 ug/L Final     ASCVD Risk   The ASCVD Risk score (Diane COLVIN, et al., 2019) failed to calculate for the following reasons:    The valid total cholesterol range is 130 to 320 mg/dL           Reviewed and updated as needed this visit by Provider                             Objective    Exam  /68 (BP Location: Right arm, Patient Position: Sitting, Cuff Size: Adult Regular)   Pulse 65   Temp 97.7  F (36.5  C)   Resp 16   Ht 1.803 m (5' 11\")   Wt 97.5 kg (215 lb)   SpO2 97%   BMI 29.99 kg/m     Estimated body mass index is 29.99 kg/m  as calculated from the following:    Height as of this encounter: 1.803 m (5' 11\").    Weight as of this encounter: 97.5 kg (215 lb).    Physical Exam  GENERAL: alert and no distress  NECK: no adenopathy, no asymmetry, masses, or scars  RESP: lungs clear to auscultation - no rales, rhonchi or wheezes  CV: regular rate and rhythm, normal S1 S2, no S3 or S4, no murmur, click or rub, no peripheral edema  ABDOMEN: soft, nontender, no hepatosplenomegaly, no masses and bowel sounds normal  MS: no gross musculoskeletal defects noted, no edema        Signed Electronically by: Primo Nielson CNP    "

## 2025-03-25 NOTE — PATIENT INSTRUCTIONS
Lets plan on repeating the CT coronary calcium scan in 2027.    Follow-up with orthopedics to discuss your shoulder pain.  If needed, I can order you an MRI in the future    Colonoscopy due in 2028.    Check PSA today.    Update labs, including hemoglobin A1c.    Blood pressure looks good today.    Stay physically active with regular exercise.    Consider Shingrix shingles vaccine at some point in the future

## 2025-03-27 ENCOUNTER — MYC MEDICAL ADVICE (OUTPATIENT)
Dept: INTERNAL MEDICINE | Facility: CLINIC | Age: 65
End: 2025-03-27
Payer: COMMERCIAL

## 2025-04-08 ENCOUNTER — TRANSFERRED RECORDS (OUTPATIENT)
Dept: HEALTH INFORMATION MANAGEMENT | Facility: CLINIC | Age: 65
End: 2025-04-08
Payer: COMMERCIAL